# Patient Record
Sex: MALE | Race: WHITE | NOT HISPANIC OR LATINO | Employment: STUDENT | URBAN - METROPOLITAN AREA
[De-identification: names, ages, dates, MRNs, and addresses within clinical notes are randomized per-mention and may not be internally consistent; named-entity substitution may affect disease eponyms.]

---
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
## 2021-11-07 ENCOUNTER — APPOINTMENT (EMERGENCY)
Dept: RADIOLOGY | Facility: HOSPITAL | Age: 21
End: 2021-11-07
Payer: COMMERCIAL

## 2021-11-07 ENCOUNTER — HOSPITAL ENCOUNTER (EMERGENCY)
Facility: HOSPITAL | Age: 21
Discharge: HOME | End: 2021-11-07
Attending: EMERGENCY MEDICINE | Admitting: EMERGENCY MEDICINE
Payer: COMMERCIAL

## 2021-11-07 VITALS
OXYGEN SATURATION: 97 % | HEART RATE: 70 BPM | SYSTOLIC BLOOD PRESSURE: 114 MMHG | RESPIRATION RATE: 18 BRPM | WEIGHT: 190 LBS | TEMPERATURE: 99.3 F | BODY MASS INDEX: 27.2 KG/M2 | HEIGHT: 70 IN | DIASTOLIC BLOOD PRESSURE: 60 MMHG

## 2021-11-07 DIAGNOSIS — E86.0 DEHYDRATION: Primary | ICD-10-CM

## 2021-11-07 LAB
ALBUMIN SERPL BCP-MCNC: 4.3 G/DL (ref 3.5–5)
ALP SERPL-CCNC: 82 U/L (ref 46–116)
ALT SERPL W P-5'-P-CCNC: 39 U/L (ref 12–78)
AMPHETAMINES SERPL QL SCN: NEGATIVE
ANION GAP SERPL CALCULATED.3IONS-SCNC: 8 MMOL/L (ref 4–13)
AST SERPL W P-5'-P-CCNC: 23 U/L (ref 5–45)
BARBITURATES UR QL: NEGATIVE
BASOPHILS # BLD AUTO: 0.03 THOUSANDS/ΜL (ref 0–0.1)
BASOPHILS NFR BLD AUTO: 0 % (ref 0–1)
BENZODIAZ UR QL: NEGATIVE
BILIRUB SERPL-MCNC: 0.81 MG/DL (ref 0.2–1)
BILIRUB UR QL STRIP: NEGATIVE
BUN SERPL-MCNC: 12 MG/DL (ref 5–25)
CALCIUM SERPL-MCNC: 8.8 MG/DL (ref 8.3–10.1)
CHLORIDE SERPL-SCNC: 103 MMOL/L (ref 100–108)
CLARITY UR: CLEAR
CO2 SERPL-SCNC: 30 MMOL/L (ref 21–32)
COCAINE UR QL: NEGATIVE
COLOR UR: YELLOW
CREAT SERPL-MCNC: 1.11 MG/DL (ref 0.6–1.3)
D DIMER PPP FEU-MCNC: <0.27 UG/ML FEU
EOSINOPHIL # BLD AUTO: 0.02 THOUSAND/ΜL (ref 0–0.61)
EOSINOPHIL NFR BLD AUTO: 0 % (ref 0–6)
ERYTHROCYTE [DISTWIDTH] IN BLOOD BY AUTOMATED COUNT: 11.9 % (ref 11.6–15.1)
FLUAV RNA RESP QL NAA+PROBE: NEGATIVE
FLUBV RNA RESP QL NAA+PROBE: NEGATIVE
GFR SERPL CREATININE-BSD FRML MDRD: 94 ML/MIN/1.73SQ M
GLUCOSE SERPL-MCNC: 89 MG/DL (ref 65–140)
GLUCOSE UR STRIP-MCNC: NEGATIVE MG/DL
HCT VFR BLD AUTO: 43 % (ref 36.5–49.3)
HGB BLD-MCNC: 15 G/DL (ref 12–17)
HGB UR QL STRIP.AUTO: NEGATIVE
IMM GRANULOCYTES # BLD AUTO: 0.01 THOUSAND/UL (ref 0–0.2)
IMM GRANULOCYTES NFR BLD AUTO: 0 % (ref 0–2)
KETONES UR STRIP-MCNC: NEGATIVE MG/DL
LEUKOCYTE ESTERASE UR QL STRIP: NEGATIVE
LIPASE SERPL-CCNC: 66 U/L (ref 73–393)
LYMPHOCYTES # BLD AUTO: 1.79 THOUSANDS/ΜL (ref 0.6–4.47)
LYMPHOCYTES NFR BLD AUTO: 21 % (ref 14–44)
MCH RBC QN AUTO: 31.1 PG (ref 26.8–34.3)
MCHC RBC AUTO-ENTMCNC: 34.9 G/DL (ref 31.4–37.4)
MCV RBC AUTO: 89 FL (ref 82–98)
METHADONE UR QL: NEGATIVE
MONOCYTES # BLD AUTO: 0.67 THOUSAND/ΜL (ref 0.17–1.22)
MONOCYTES NFR BLD AUTO: 8 % (ref 4–12)
NEUTROPHILS # BLD AUTO: 5.97 THOUSANDS/ΜL (ref 1.85–7.62)
NEUTS SEG NFR BLD AUTO: 71 % (ref 43–75)
NITRITE UR QL STRIP: NEGATIVE
NRBC BLD AUTO-RTO: 0 /100 WBCS
OPIATES UR QL SCN: NEGATIVE
OXYCODONE+OXYMORPHONE UR QL SCN: NEGATIVE
PCP UR QL: NEGATIVE
PH UR STRIP.AUTO: 6.5 [PH]
PLATELET # BLD AUTO: 218 THOUSANDS/UL (ref 149–390)
PMV BLD AUTO: 9.8 FL (ref 8.9–12.7)
POTASSIUM SERPL-SCNC: 3.9 MMOL/L (ref 3.5–5.3)
PROT SERPL-MCNC: 7.2 G/DL (ref 6.4–8.2)
PROT UR STRIP-MCNC: NEGATIVE MG/DL
RBC # BLD AUTO: 4.83 MILLION/UL (ref 3.88–5.62)
RSV RNA RESP QL NAA+PROBE: NEGATIVE
S PYO DNA THROAT QL NAA+PROBE: NORMAL
SARS-COV-2 RNA RESP QL NAA+PROBE: NEGATIVE
SODIUM SERPL-SCNC: 141 MMOL/L (ref 136–145)
SP GR UR STRIP.AUTO: 1.01 (ref 1–1.03)
THC UR QL: NEGATIVE
TROPONIN I SERPL-MCNC: <0.02 NG/ML
UROBILINOGEN UR QL STRIP.AUTO: 0.2 E.U./DL
WBC # BLD AUTO: 8.49 THOUSAND/UL (ref 4.31–10.16)

## 2021-11-07 PROCEDURE — 85025 COMPLETE CBC W/AUTO DIFF WBC: CPT | Performed by: EMERGENCY MEDICINE

## 2021-11-07 PROCEDURE — 80307 DRUG TEST PRSMV CHEM ANLYZR: CPT | Performed by: EMERGENCY MEDICINE

## 2021-11-07 PROCEDURE — 83690 ASSAY OF LIPASE: CPT | Performed by: EMERGENCY MEDICINE

## 2021-11-07 PROCEDURE — 99285 EMERGENCY DEPT VISIT HI MDM: CPT

## 2021-11-07 PROCEDURE — 84484 ASSAY OF TROPONIN QUANT: CPT | Performed by: EMERGENCY MEDICINE

## 2021-11-07 PROCEDURE — 85379 FIBRIN DEGRADATION QUANT: CPT | Performed by: EMERGENCY MEDICINE

## 2021-11-07 PROCEDURE — 96360 HYDRATION IV INFUSION INIT: CPT

## 2021-11-07 PROCEDURE — 36415 COLL VENOUS BLD VENIPUNCTURE: CPT | Performed by: EMERGENCY MEDICINE

## 2021-11-07 PROCEDURE — 71045 X-RAY EXAM CHEST 1 VIEW: CPT

## 2021-11-07 PROCEDURE — 0241U HB NFCT DS VIR RESP RNA 4 TRGT: CPT | Performed by: EMERGENCY MEDICINE

## 2021-11-07 PROCEDURE — 81003 URINALYSIS AUTO W/O SCOPE: CPT | Performed by: EMERGENCY MEDICINE

## 2021-11-07 PROCEDURE — 80053 COMPREHEN METABOLIC PANEL: CPT | Performed by: EMERGENCY MEDICINE

## 2021-11-07 PROCEDURE — 87651 STREP A DNA AMP PROBE: CPT | Performed by: EMERGENCY MEDICINE

## 2021-11-07 PROCEDURE — 99285 EMERGENCY DEPT VISIT HI MDM: CPT | Performed by: EMERGENCY MEDICINE

## 2021-11-07 RX ADMIN — SODIUM CHLORIDE 1000 ML: 0.9 INJECTION, SOLUTION INTRAVENOUS at 19:55

## 2022-06-11 ENCOUNTER — OFFICE VISIT (OUTPATIENT)
Dept: URGENT CARE | Facility: CLINIC | Age: 22
End: 2022-06-11
Payer: COMMERCIAL

## 2022-06-11 ENCOUNTER — APPOINTMENT (OUTPATIENT)
Dept: RADIOLOGY | Facility: CLINIC | Age: 22
End: 2022-06-11
Payer: COMMERCIAL

## 2022-06-11 VITALS
HEIGHT: 71 IN | OXYGEN SATURATION: 99 % | RESPIRATION RATE: 16 BRPM | WEIGHT: 197 LBS | TEMPERATURE: 96.7 F | BODY MASS INDEX: 27.58 KG/M2 | HEART RATE: 87 BPM | DIASTOLIC BLOOD PRESSURE: 73 MMHG | SYSTOLIC BLOOD PRESSURE: 129 MMHG

## 2022-06-11 DIAGNOSIS — S40.811A ARM ABRASION, RIGHT, INITIAL ENCOUNTER: ICD-10-CM

## 2022-06-11 DIAGNOSIS — S43.401A SPRAIN OF RIGHT SHOULDER, UNSPECIFIED SHOULDER SPRAIN TYPE, INITIAL ENCOUNTER: Primary | ICD-10-CM

## 2022-06-11 DIAGNOSIS — S60.022A CONTUSION OF LEFT INDEX FINGER WITHOUT DAMAGE TO NAIL, INITIAL ENCOUNTER: ICD-10-CM

## 2022-06-11 DIAGNOSIS — S69.90XA FINGER INJURY, INITIAL ENCOUNTER: ICD-10-CM

## 2022-06-11 DIAGNOSIS — S49.91XA SHOULDER INJURY, RIGHT, INITIAL ENCOUNTER: ICD-10-CM

## 2022-06-11 PROCEDURE — 73140 X-RAY EXAM OF FINGER(S): CPT

## 2022-06-11 PROCEDURE — 99203 OFFICE O/P NEW LOW 30 MIN: CPT | Performed by: PHYSICIAN ASSISTANT

## 2022-06-11 PROCEDURE — 73030 X-RAY EXAM OF SHOULDER: CPT

## 2022-06-11 RX ORDER — CEPHALEXIN 500 MG/1
500 CAPSULE ORAL EVERY 8 HOURS SCHEDULED
Qty: 15 CAPSULE | Refills: 0 | Status: SHIPPED | OUTPATIENT
Start: 2022-06-11 | End: 2022-06-16
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
## 2022-06-11 NOTE — PATIENT INSTRUCTIONS
My interpretation of x-rays is no acute fractures or dislocations, official read is pending  Wounds have been cleaned and antibiotic ointment applied  To keep areas clean and dry for next 48-72 hours  Take antibiotic as prescribed for the next 5 days  Patient is up-to-date on his tetanus

## 2022-08-05 ENCOUNTER — HOSPITAL ENCOUNTER (EMERGENCY)
Facility: HOSPITAL | Age: 22
Discharge: HOME | End: 2022-08-05
Attending: EMERGENCY MEDICINE
Payer: COMMERCIAL

## 2022-08-05 VITALS
HEART RATE: 97 BPM | RESPIRATION RATE: 18 BRPM | DIASTOLIC BLOOD PRESSURE: 65 MMHG | SYSTOLIC BLOOD PRESSURE: 141 MMHG | TEMPERATURE: 98.1 F | OXYGEN SATURATION: 98 %

## 2022-08-05 DIAGNOSIS — S61.419A HAND LACERATION: Primary | ICD-10-CM

## 2022-08-05 PROCEDURE — 99282 EMERGENCY DEPT VISIT SF MDM: CPT

## 2022-08-05 PROCEDURE — 12001 RPR S/N/AX/GEN/TRNK 2.5CM/<: CPT | Performed by: EMERGENCY MEDICINE

## 2022-08-05 PROCEDURE — 99282 EMERGENCY DEPT VISIT SF MDM: CPT | Performed by: EMERGENCY MEDICINE

## 2022-08-05 RX ORDER — LIDOCAINE HYDROCHLORIDE 10 MG/ML
5 INJECTION, SOLUTION EPIDURAL; INFILTRATION; INTRACAUDAL; PERINEURAL ONCE
Status: COMPLETED | OUTPATIENT
Start: 2022-08-05 | End: 2022-08-05

## 2022-08-05 RX ADMIN — LIDOCAINE HYDROCHLORIDE 5 ML: 10 INJECTION, SOLUTION EPIDURAL; INFILTRATION; INTRACAUDAL; PERINEURAL at 01:16

## 2022-08-05 NOTE — ED NOTES
Pt seen, assessed and d/c by provider  Pt appeared to be in no acute distress upon discharge  Pt able to ambulate well without assistance upon exiting        Donal Berry RN  08/05/22 9410
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
## 2022-08-06 NOTE — ED PROCEDURE NOTE
PROCEDURE  Laceration repair    Date/Time: 8/5/2022 6:00 AM  Performed by: Mala Grossman MD  Authorized by: Mala Grossman MD   Consent given by: patient  Patient identity confirmed: arm band, verbally with patient and hospital-assigned identification number  Body area: upper extremity  Location details: right hand  Anesthesia: local infiltration    Anesthesia:  Local Anesthetic: lidocaine 1% without epinephrine  Anesthetic total: 5 mL      Procedure Details:  Irrigation solution: saline  Irrigation method: jet lavage  Skin closure: 4-0 nylon  Approximation: close  Approximation difficulty: simple  Dressing: 4x4 sterile gauze  Patient tolerance: patient tolerated the procedure well with no immediate complications           Mala Grossman MD  08/06/22 [id number]       Mala Grossman MD  08/06/22 6714

## 2022-11-07 ENCOUNTER — HOSPITAL ENCOUNTER (EMERGENCY)
Facility: HOSPITAL | Age: 22
Discharge: HOME | End: 2022-11-07
Attending: EMERGENCY MEDICINE

## 2022-11-07 VITALS
DIASTOLIC BLOOD PRESSURE: 64 MMHG | WEIGHT: 209.44 LBS | SYSTOLIC BLOOD PRESSURE: 124 MMHG | TEMPERATURE: 98.5 F | HEART RATE: 108 BPM | BODY MASS INDEX: 29.21 KG/M2 | RESPIRATION RATE: 22 BRPM | OXYGEN SATURATION: 100 %

## 2022-11-07 DIAGNOSIS — B34.9 VIRAL SYNDROME: Primary | ICD-10-CM

## 2022-11-07 DIAGNOSIS — J32.9 SINUSITIS: ICD-10-CM

## 2022-11-07 LAB
FLUAV RNA RESP QL NAA+PROBE: NEGATIVE
FLUBV RNA RESP QL NAA+PROBE: NEGATIVE
RSV RNA RESP QL NAA+PROBE: NEGATIVE
S PYO DNA THROAT QL NAA+PROBE: NOT DETECTED
SARS-COV-2 RNA RESP QL NAA+PROBE: NEGATIVE

## 2022-11-07 RX ORDER — AMOXICILLIN 500 MG/1
500 CAPSULE ORAL 3 TIMES DAILY
Qty: 21 CAPSULE | Refills: 0 | Status: SHIPPED | OUTPATIENT
Start: 2022-11-07 | End: 2022-11-14

## 2022-11-07 RX ORDER — ACETAMINOPHEN 325 MG/1
650 TABLET ORAL ONCE
Status: COMPLETED | OUTPATIENT
Start: 2022-11-07 | End: 2022-11-07

## 2022-11-07 RX ADMIN — ACETAMINOPHEN 650 MG: 325 TABLET, FILM COATED ORAL at 19:27

## 2022-11-08 NOTE — DISCHARGE INSTRUCTIONS
We will call you tomorrow with the covid, flu, RSV and strep results  Rest as needed  Tylenol and/or Advil as needed for pain or fever  Keep hydrated  It is fine to use over the counter cough or cold medicines as needed for symptoms

## 2022-11-08 NOTE — ED PROVIDER NOTES
History  Chief Complaint   Patient presents with   • Fever - 9 weeks to 74 years     Started with neck pain several days  Ago progressed to fever, chills  States hearing off  C/o weakness all over, dizziness, back pain when breathing in and sore throat     24 yo male c/o neck and upper back pains that started 4 days ago  Then developed sinus pressure and congestion and ears felt blocked  + fever since last night   + sore throat and mild dry cough  No vomiting or diarrhea  History provided by:  Patient   used: No    Fever - 9 weeks to 74 years  Associated symptoms: congestion, cough, ear pain, myalgias and sore throat    Associated symptoms: no diarrhea and no vomiting        None       History reviewed  No pertinent past medical history  Past Surgical History:   Procedure Laterality Date   • HERNIA REPAIR         History reviewed  No pertinent family history  I have reviewed and agree with the history as documented  E-Cigarette/Vaping   • E-Cigarette Use Current Every Day User      E-Cigarette/Vaping Substances     Social History     Tobacco Use   • Smoking status: Former Smoker     Packs/day: 0 00     Types: E-Cigarettes   • Smokeless tobacco: Never Used   Vaping Use   • Vaping Use: Every day   Substance Use Topics   • Alcohol use: Yes     Comment: socially   • Drug use: No       Review of Systems   Constitutional: Positive for fever  HENT: Positive for congestion, ear pain and sore throat  Respiratory: Positive for cough  Gastrointestinal: Negative for diarrhea and vomiting  Musculoskeletal: Positive for back pain, myalgias and neck pain  Physical Exam  Physical Exam  Vitals and nursing note reviewed  Constitutional:       General: He is not in acute distress  Appearance: He is not ill-appearing  HENT:      Head: Normocephalic and atraumatic        Right Ear: Tympanic membrane and external ear normal       Left Ear: Tympanic membrane and external ear normal  Nose: Nose normal  No congestion  Mouth/Throat:      Mouth: Mucous membranes are moist       Pharynx: Oropharynx is clear  Posterior oropharyngeal erythema present  No oropharyngeal exudate  Eyes:      General: No scleral icterus  Conjunctiva/sclera: Conjunctivae normal    Cardiovascular:      Rate and Rhythm: Normal rate and regular rhythm  Heart sounds: Normal heart sounds  Pulmonary:      Effort: Pulmonary effort is normal  No respiratory distress  Breath sounds: Normal breath sounds  Abdominal:      General: There is no distension  Palpations: Abdomen is soft  Tenderness: There is no abdominal tenderness  Musculoskeletal:         General: Normal range of motion  Cervical back: Normal range of motion and neck supple  Right lower leg: No edema  Lymphadenopathy:      Cervical: No cervical adenopathy  Skin:     General: Skin is warm and dry  Neurological:      General: No focal deficit present  Mental Status: He is alert and oriented to person, place, and time  Cranial Nerves: No cranial nerve deficit  Motor: No weakness     Psychiatric:         Mood and Affect: Mood normal          Behavior: Behavior normal          Vital Signs  ED Triage Vitals   Temperature Pulse Respirations Blood Pressure SpO2   11/07/22 1846 11/07/22 1846 11/07/22 1846 11/07/22 2040 11/07/22 1846   100 1 °F (37 8 °C) (!) 108 22 124/64 100 %      Temp Source Heart Rate Source Patient Position - Orthostatic VS BP Location FiO2 (%)   11/07/22 1846 11/07/22 1846 11/07/22 1846 11/07/22 1846 --   Tympanic Monitor Sitting Right arm       Pain Score       11/07/22 1846       7           Vitals:    11/07/22 1846 11/07/22 2040   BP:  124/64   Pulse: (!) 108    Patient Position - Orthostatic VS: Sitting Lying         Visual Acuity      ED Medications  Medications   acetaminophen (TYLENOL) tablet 650 mg (650 mg Oral Given 11/7/22 1927)       Diagnostic Studies  Results Reviewed Procedure Component Value Units Date/Time    Strep A PCR [[id number]] Collected: 11/07/22 2045    Lab Status: In process Specimen: Throat Updated: 11/07/22 2049    FLU/RSV/COVID - if FLU/RSV clinically relevant [[id number]] Collected: 11/07/22 2045    Lab Status: In process Specimen: Nares from Nose Updated: 11/07/22 2049                 No orders to display              Procedures  Procedures         ED Course                               SBIRT 22yo+    Flowsheet Row Most Recent Value   SBIRT (25 yo +)    In order to provide better care to our patients, we are screening all of our patients for alcohol and drug use  Would it be okay to ask you these screening questions? No Filed at: 11/07/2022 2039                    MDM  Number of Diagnoses or Management Options  Sinusitis  Viral syndrome  Diagnosis management comments: Advised likely viral illness  Will screen for strep, covid, flu, and RSV  Advised will send script in for Amoxil for sinusitis but to wait until viral studies come back and give it a few days and only start it if getting worse or not improving  Disposition  Final diagnoses:   Viral syndrome   Sinusitis     Time reflects when diagnosis was documented in both MDM as applicable and the Disposition within this note     Time User Action Codes Description Comment    77/9/8553  4:90 PM Sheryl Henle A Add [B14 5] Viral syndrome     28/7/1728  3:98 PM Kameron Rincon Add [A46 7] Sinusitis       ED Disposition     ED Disposition   Discharge    Condition   Stable    Date/Time   Mon Nov 7, 2022  8:44 PM    Comment   Jericho Suzie discharge to home/self care                 Follow-up Information     Follow up With Specialties Details Why   Rocio Edwards MD Family Medicine  As needed [address]  [phone]            Patient's Medications   Discharge Prescriptions    AMOXICILLIN (AMOXIL) 500 MG CAPSULE    Take 1 capsule (500 mg total) by mouth 3 (three) times a day for 7 days       Start Date: 11/7/2022 End Date: 11/14/2022       Order Dose: 500 mg       Quantity: 21 capsule    Refills: 0       No discharge procedures on file      PDMP Review     None          ED Provider  Electronically Signed by           Jeff Jaimes MD  38/87/52 6450

## 2022-11-18 ENCOUNTER — HOSPITAL ENCOUNTER (EMERGENCY)
Facility: HOSPITAL | Age: 22
Discharge: HOME | End: 2022-11-18
Attending: EMERGENCY MEDICINE

## 2022-11-18 VITALS
HEART RATE: 78 BPM | RESPIRATION RATE: 20 BRPM | OXYGEN SATURATION: 99 % | SYSTOLIC BLOOD PRESSURE: 157 MMHG | DIASTOLIC BLOOD PRESSURE: 81 MMHG | TEMPERATURE: 99.2 F

## 2022-11-18 DIAGNOSIS — J02.9 VIRAL PHARYNGITIS: Primary | ICD-10-CM

## 2022-11-18 LAB — HETEROPH AB SER QL: POSITIVE

## 2022-11-18 RX ORDER — IBUPROFEN 400 MG/1
400 TABLET ORAL ONCE
Status: COMPLETED | OUTPATIENT
Start: 2022-11-18 | End: 2022-11-18

## 2022-11-18 RX ADMIN — IBUPROFEN 400 MG: 400 TABLET, FILM COATED ORAL at 06:45

## 2022-11-18 NOTE — DISCHARGE INSTRUCTIONS
We have sent a Monospot and will follow-up  Finish the amoxicillin once started    Motrin 400 mg every 6 hours may be taken for pain  Tylenol 975 mg every 6 hours may be taken for pain    Encourage fluids  Follow-up with your primary care provider if no improvement next 2-3 days      Return to the ED for drooling, hoarseness of voice, worsening symptoms

## 2022-11-18 NOTE — ED PROVIDER NOTES
History  Chief Complaint   Patient presents with   • Sore Throat     Pt c/o of painful sore throat  Pt  says he tested negative for strep two weeks ago but thinks he may now have it  Pt  started taking antibiotics last weds  Patient is a 26-year-old white male who reports approximally 1 1/2 week  history of sore throat  Was seen here on November 7th  Had a negative strep and negative flu/COVID/RSV at that time  Was prescribed amoxicillin which he has been taking for the past 7 days  States he noticed "white spots" to  posterior pharynx this morning which prompted the ED visit  Denies fever or shaking chills  Denies drooling  Denies voice changes  Denies ear pain or rhinorrhea  Denies any other complaints  None       No past medical history on file  Past Surgical History:   Procedure Laterality Date   • HERNIA REPAIR         No family history on file  I have reviewed and agree with the history as documented  E-Cigarette/Vaping   • E-Cigarette Use Current Every Day User      E-Cigarette/Vaping Substances     Social History     Tobacco Use   • Smoking status: Former     Packs/day: 0 00     Types: E-Cigarettes, Cigarettes   • Smokeless tobacco: Never   Vaping Use   • Vaping Use: Every day   Substance Use Topics   • Alcohol use: Yes     Comment: socially   • Drug use: No       Review of Systems   Constitutional: Negative for chills and fever  HENT: Positive for sore throat  Negative for ear pain, trouble swallowing and voice change  Eyes: Negative for pain  Respiratory: Negative for cough and shortness of breath  Cardiovascular: Negative for chest pain and palpitations  Gastrointestinal: Negative for abdominal pain, nausea and vomiting  Genitourinary: Negative for dysuria and hematuria  Musculoskeletal: Negative for arthralgias and back pain  Skin: Negative for color change and rash  Neurological: Negative for syncope and facial asymmetry     All other systems reviewed and are negative  Physical Exam  Physical Exam  Vitals and nursing note reviewed  Constitutional:       General: He is not in acute distress  Appearance: He is well-developed  He is not ill-appearing, toxic-appearing or diaphoretic  HENT:      Head: Normocephalic and atraumatic  Right Ear: Tympanic membrane and ear canal normal       Left Ear: Tympanic membrane and ear canal normal       Nose: No congestion or rhinorrhea  Mouth/Throat:      Mouth: Mucous membranes are moist  No oral lesions  Pharynx: Oropharyngeal exudate present  No pharyngeal swelling or uvula swelling  Tonsils: No tonsillar abscesses  Comments: Exudate on the posterior pharynx  No abscess  Eyes:      Conjunctiva/sclera: Conjunctivae normal       Pupils: Pupils are equal, round, and reactive to light  Cardiovascular:      Rate and Rhythm: Normal rate and regular rhythm  Heart sounds: Normal heart sounds  Pulmonary:      Effort: Pulmonary effort is normal       Breath sounds: Normal breath sounds  Abdominal:      General: Bowel sounds are normal       Palpations: Abdomen is soft  Musculoskeletal:      Cervical back: Normal range of motion and neck supple  Lymphadenopathy:      Cervical: No cervical adenopathy  Skin:     General: Skin is warm and dry  Capillary Refill: Capillary refill takes less than 2 seconds  Neurological:      Mental Status: He is alert           Vital Signs  ED Triage Vitals [11/18/22 0627]   Temperature Pulse Respirations Blood Pressure SpO2   99 2 °F (37 3 °C) 78 20 157/81 99 %      Temp Source Heart Rate Source Patient Position - Orthostatic VS BP Location FiO2 (%)   Oral Monitor Lying Right arm --      Pain Score       8           Vitals:    11/18/22 0627   BP: 157/81   Pulse: 78   Patient Position - Orthostatic VS: Lying         Visual Acuity      ED Medications  Medications   ibuprofen (MOTRIN) tablet 400 mg (400 mg Oral Given 11/18/22 0645)       Diagnostic Studies  Results Reviewed     Procedure Component Value Units Date/Time    Mononucleosis screen [[id number]] Collected: 11/18/22 0650    Lab Status: No result Specimen: Blood from Arm, Right                  No orders to display              Procedures  Procedures         ED Course                               SBIRT 22yo+    Flowsheet Row Most Recent Value   SBIRT (25 yo +)    In order to provide better care to our patients, we are screening all of our patients for alcohol and drug use  Would it be okay to ask you these screening questions? No Filed at: 11/18/2022 6606                    [facility]  Number of Diagnoses or Management Options  Viral pharyngitis: new and requires workup  Diagnosis management comments: 20-year-old white male well-appearing nontoxic  Presents with 1 1/2  week history of sore throat  Currently on amoxicillin for past 7 days  Had negative rapid strep and negative flu/RSV/COVID 11 days ago in the ED  He has noted exudate on his posterior pharynx this morning prompting ED visit  There is no evidence of tonsillar or pharyngeal abscess  There is no drooling or trismus  Advised to complete antibiotic 1 started  Monospot was sent  Suspect viral etiology  Advised follow-up with primary care provider next 2-3 days if no improvement    Return precautions given including hoarseness of voice, drooling, worsening symptoms       Amount and/or Complexity of Data Reviewed  Clinical lab tests: ordered  Tests in the medicine section of CPT®: ordered  Decide to obtain previous medical records or to obtain history from someone other than the patient: yes  Review and summarize past medical records: yes  Independent visualization of images, tracings, or specimens: yes    Risk of Complications, Morbidity, and/or Mortality  Presenting problems: low  Diagnostic procedures: low  Management options: low    Patient Progress  Patient progress: stable      Disposition  Final diagnoses:   Viral pharyngitis     Time reflects when diagnosis was documented in both MDM as applicable and the Disposition within this note     Time User Action Codes Description Comment    11/18/2022  6:44 AM Usha Yeh Add [J02 9] Viral pharyngitis       ED Disposition     ED Disposition   Discharge    Condition   Stable    Date/Time   Fri Nov 18, 2022  6:44 AM    Comment   Camilo Grubbs discharge to home/self care  Follow-up Information     Follow up With Specialties Details Why   [address], [address] [address]  [phone]            Patient's Medications    No medications on file       No discharge procedures on file      PDMP Review     None          ED Provider  Electronically Signed by           Evangelina Mccrudy PA-C  11/18/22 8944

## 2023-12-14 ENCOUNTER — HOSPITAL ENCOUNTER (EMERGENCY)
Facility: HOSPITAL | Age: 23
Discharge: HOME | End: 2023-12-15
Attending: EMERGENCY MEDICINE
Payer: COMMERCIAL

## 2023-12-14 VITALS
SYSTOLIC BLOOD PRESSURE: 133 MMHG | HEIGHT: 71 IN | WEIGHT: 218.2 LBS | OXYGEN SATURATION: 100 % | HEART RATE: 91 BPM | BODY MASS INDEX: 30.55 KG/M2 | DIASTOLIC BLOOD PRESSURE: 74 MMHG | TEMPERATURE: 97.9 F | RESPIRATION RATE: 20 BRPM

## 2023-12-14 DIAGNOSIS — R06.00 DYSPNEA: Primary | ICD-10-CM

## 2023-12-14 PROCEDURE — 99284 EMERGENCY DEPT VISIT MOD MDM: CPT | Performed by: EMERGENCY MEDICINE

## 2023-12-14 PROCEDURE — 0241U HB NFCT DS VIR RESP RNA 4 TRGT: CPT | Performed by: EMERGENCY MEDICINE

## 2023-12-14 PROCEDURE — 99285 EMERGENCY DEPT VISIT HI MDM: CPT

## 2023-12-15 ENCOUNTER — APPOINTMENT (EMERGENCY)
Dept: RADIOLOGY | Facility: HOSPITAL | Age: 23
End: 2023-12-15
Payer: COMMERCIAL

## 2023-12-15 LAB
ATRIAL RATE: 62 BPM
FLUAV RNA RESP QL NAA+PROBE: NEGATIVE
FLUBV RNA RESP QL NAA+PROBE: NEGATIVE
P AXIS: 43 DEGREES
PR INTERVAL: 134 MS
QRS AXIS: 55 DEGREES
QRSD INTERVAL: 90 MS
QT INTERVAL: 368 MS
QTC INTERVAL: 373 MS
RSV RNA RESP QL NAA+PROBE: NEGATIVE
SARS-COV-2 RNA RESP QL NAA+PROBE: NEGATIVE
T WAVE AXIS: 39 DEGREES
VENTRICULAR RATE: 62 BPM

## 2023-12-15 PROCEDURE — 93005 ELECTROCARDIOGRAM TRACING: CPT

## 2023-12-15 PROCEDURE — 71045 X-RAY EXAM CHEST 1 VIEW: CPT

## 2023-12-15 NOTE — ED PROVIDER NOTES
History  Chief Complaint   Patient presents with    Shortness of Breath     Pt states a half hour ago he started having SOB. Pt states SOB increases when "pressing on liver". Pt has hx of anxiety. Pt denies chest pain. Patient presents for evaluation of shortness of breath. States he got discomfort in his upper abdomen that when he pushed on that made it hard to breathe. He also admits to history of anxiety. Was sick about a week ago not sure if he fully recovered and [facility] History provided by:  Patient   used: No    Shortness of Breath  Associated symptoms: no abdominal pain, no chest pain, no cough, no ear pain, no fever, no rash, no sore throat and no vomiting        None       History reviewed. No pertinent past medical history. Past Surgical History:   Procedure Laterality Date    HERNIA REPAIR         History reviewed. No pertinent family history. I have reviewed and agree with the history as documented. E-Cigarette/Vaping    E-Cigarette Use Current Every Day User      E-Cigarette/Vaping Substances     Social History     Tobacco Use    Smoking status: Former     Types: E-Cigarettes, Cigarettes    Smokeless tobacco: Never   Vaping Use    Vaping status: Every Day   Substance Use Topics    Alcohol use: Yes     Comment: socially    Drug use: No       Review of Systems   Constitutional:  Negative for chills and fever. HENT:  Negative for ear pain and sore throat. Eyes:  Negative for pain and visual disturbance. Respiratory:  Positive for shortness of breath. Negative for cough. Cardiovascular:  Negative for chest pain and palpitations. Gastrointestinal:  Negative for abdominal pain and vomiting. Genitourinary:  Negative for dysuria and hematuria. Musculoskeletal:  Negative for arthralgias and back pain. Skin:  Negative for color change and rash. Neurological:  Negative for seizures and syncope.    All other systems reviewed and are negative. Physical Exam  Physical Exam  Vitals and nursing note reviewed. Constitutional:       General: He is not in acute distress. Cardiovascular:      Rate and Rhythm: Normal rate and regular rhythm. Pulmonary:      Effort: Pulmonary effort is normal. No respiratory distress. Breath sounds: Normal breath sounds. Abdominal:      Tenderness: There is no abdominal tenderness. Neurological:      General: No focal deficit present. Mental Status: He is alert and oriented to person, place, and time. Vital Signs  ED Triage Vitals [12/14/23 2335]   Temperature Pulse Respirations Blood Pressure SpO2   97.9 °F (36.6 °C) 91 20 133/74 100 %      Temp Source Heart Rate Source Patient Position - Orthostatic VS BP Location FiO2 (%)   Oral Monitor Sitting Left arm --      Pain Score       No Pain           Vitals:    12/14/23 2335   BP: 133/74   Pulse: 91   Patient Position - Orthostatic VS: Sitting         Visual Acuity      ED Medications  Medications - No data to display    Diagnostic Studies  Results Reviewed       Procedure Component Value Units Date/Time    FLU/RSV/COVID - if FLU/RSV clinically relevant [[id number]]  (Normal) Collected: 12/14/23 2345    Lab Status: Final result Specimen: Nares from Nose Updated: 12/15/23 0049     SARS-CoV-2 Negative     INFLUENZA A PCR Negative     INFLUENZA B PCR Negative     RSV PCR Negative    Narrative:      FOR PEDIATRIC PATIENTS - copy/paste COVID Guidelines URL to browser: [email or web address]    SARS-CoV-2 assay is a Nucleic Acid Amplification assay intended for the  qualitative detection of nucleic acid from SARS-CoV-2 in nasopharyngeal  swabs. Results are for the presumptive identification of SARS-CoV-2 RNA. Positive results are indicative of infection with SARS-CoV-2, the virus  causing COVID-19, but do not rule out bacterial infection or co-infection  with other viruses.  Laboratories within the [facility] and its  territories are required to report all positive results to the appropriate  public health authorities. Negative results do not preclude SARS-CoV-2  infection and should not be used as the sole basis for treatment or other  patient management decisions. Negative results must be combined with  clinical observations, patient history, and epidemiological information. This test has not been FDA cleared or approved. This test has been authorized by FDA under an Emergency Use Authorization  (EUA). This test is only authorized for the duration of time the  declaration that circumstances exist justifying the authorization of the  emergency use of an in vitro diagnostic tests for detection of SARS-CoV-2  virus and/or diagnosis of COVID-19 infection under section 564(b)(1) of  the Act, 21 U. S.C. 744ITU-9(Q)(9), unless the authorization is terminated  or revoked sooner. The test has been validated but independent review by FDA  and CLIA is pending. Test performed using [employer] GeneXpert: This RT-PCR assay targets N2,  a region unique to SARS-CoV-2. A conserved region in the E-gene was chosen  for pan-Sarbecovirus detection which includes SARS-CoV-2. According to CMS-2020-01-R, this platform meets the definition of high-throughput technology.                    XR chest 1 view portable    (Results Pending)              Procedures  ECG 12 Lead Documentation Only    Date/Time: 12/15/2023 12:19 AM    Performed by: Patsy Alejandro DO  Authorized by: Patsy Alejandro DO    ECG reviewed by me, the ED Provider: yes    Patient location:  ED  Interpretation:     Interpretation: normal    Rate:     ECG rate:  62    ECG rate assessment: normal    Rhythm:     Rhythm: sinus rhythm    Ectopy:     Ectopy: none    ST segments:     ST segments:  Normal  T waves:     T waves: normal             ED Course                               SBIRT 22yo+      Flowsheet Row Most Recent Value   Initial Alcohol Screen: US AUDIT-C     1. How often do you have a drink containing alcohol? 2 Filed at: 12/14/2023 2340   2. How many drinks containing alcohol do you have on a typical day you are drinking? 0 Filed at: 12/14/2023 2340   3a. Male UNDER 65: How often do you have five or more drinks on one occasion? 0 Filed at: 12/14/2023 2340   3b. FEMALE Any Age, or MALE 65+: How often do you have 4 or more drinks on one occassion? 0 Filed at: 12/14/2023 2340   Audit-C Score 2 Filed at: 12/14/2023 2340   WANDA: How many times in the past year have you. .. Used an illegal drug or used a prescription medication for non-medical reasons? Never Filed at: 12/14/2023 2340                      Medical Decision Making  Pulse ox 100% on room air indicating adequate oxygenation. CXR: NAD as read by me    Amount and/or Complexity of Data Reviewed  Radiology: ordered and independent interpretation performed. ECG/medicine tests: ordered and independent interpretation performed. Disposition  Final diagnoses:   Dyspnea     Time reflects when diagnosis was documented in both MDM as applicable and the Disposition within this note       Time User Action Codes Description Comment    12/15/2023 12:26 AM Francisco Carcamo Add [R06.00] Dyspnea           ED Disposition       ED Disposition   Discharge    Condition   Stable    Date/Time   Fri Dec 15, 2023 [address] discharge to home/self care. Follow-up Information       Follow up With Specialties Details Why [address], MD Family Medicine In 1 week  [place] [address]  [phone]              There are no discharge medications for this patient. No discharge procedures on file.     PDMP Review       None            ED Provider  Electronically Signed by             Francisco Carcamo DO  12/15/23 5006

## 2024-07-27 ENCOUNTER — HOSPITAL ENCOUNTER (EMERGENCY)
Facility: HOSPITAL | Age: 24
Discharge: HOME | End: 2024-07-27
Attending: EMERGENCY MEDICINE
Payer: COMMERCIAL

## 2024-07-27 VITALS
SYSTOLIC BLOOD PRESSURE: 123 MMHG | WEIGHT: 203.6 LBS | BODY MASS INDEX: 28.4 KG/M2 | HEART RATE: 90 BPM | DIASTOLIC BLOOD PRESSURE: 82 MMHG | RESPIRATION RATE: 18 BRPM | TEMPERATURE: 98.3 F | OXYGEN SATURATION: 99 %

## 2024-07-27 DIAGNOSIS — K08.89 DENTALGIA: Primary | ICD-10-CM

## 2024-07-27 DIAGNOSIS — R42 VERTIGO: ICD-10-CM

## 2024-07-27 LAB
FLUAV RNA RESP QL NAA+PROBE: NEGATIVE
FLUBV RNA RESP QL NAA+PROBE: NEGATIVE
RSV RNA RESP QL NAA+PROBE: NEGATIVE
SARS-COV-2 RNA RESP QL NAA+PROBE: NEGATIVE

## 2024-07-27 PROCEDURE — 99284 EMERGENCY DEPT VISIT MOD MDM: CPT | Performed by: EMERGENCY MEDICINE

## 2024-07-27 PROCEDURE — 99283 EMERGENCY DEPT VISIT LOW MDM: CPT

## 2024-07-27 PROCEDURE — 0241U HB NFCT DS VIR RESP RNA 4 TRGT: CPT | Performed by: EMERGENCY MEDICINE

## 2024-07-27 RX ORDER — AMOXICILLIN AND CLAVULANATE POTASSIUM 875; 125 MG/1; MG/1
1 TABLET, FILM COATED ORAL EVERY 12 HOURS SCHEDULED
Qty: 14 TABLET | Refills: 0 | Status: SHIPPED | OUTPATIENT
Start: 2024-07-27 | End: 2024-08-03

## 2024-07-27 RX ORDER — AMOXICILLIN AND CLAVULANATE POTASSIUM 875; 125 MG/1; MG/1
1 TABLET, FILM COATED ORAL ONCE
Status: COMPLETED | OUTPATIENT
Start: 2024-07-27 | End: 2024-07-27

## 2024-07-27 RX ORDER — MECLIZINE HYDROCHLORIDE 25 MG/1
50 TABLET ORAL ONCE
Status: COMPLETED | OUTPATIENT
Start: 2024-07-27 | End: 2024-07-27

## 2024-07-27 RX ORDER — MECLIZINE HYDROCHLORIDE 25 MG/1
25 TABLET ORAL 3 TIMES DAILY PRN
Qty: 15 TABLET | Refills: 0 | Status: SHIPPED | OUTPATIENT
Start: 2024-07-27

## 2024-07-27 RX ADMIN — AMOXICILLIN AND CLAVULANATE POTASSIUM 1 TABLET: 875; 125 TABLET, FILM COATED ORAL at 18:41

## 2024-07-27 RX ADMIN — MECLIZINE HYDROCHLORIDE 50 MG: 25 TABLET ORAL at 18:41

## 2024-07-27 NOTE — DISCHARGE INSTRUCTIONS
Take the medicines as prescribed.  Rest as needed, move slowly and carefully.  Keep hydrated with fluids.  We will call you if your covid test is positive.

## 2024-07-27 NOTE — Clinical Note
Anthony Ramirez was seen and treated in our emergency department on 7/27/2024.                Diagnosis:     Anthony  may return to work on return date.    He may return on this date: 07/29/2024         If you have any questions or concerns, please don't hesitate to call.      Rosi Neal MD    ______________________________           _______________          _______________  Hospital Representative                              Date                                Time

## 2024-07-28 NOTE — ED PROVIDER NOTES
"History  Chief Complaint   Patient presents with    Dizziness     Here with mother. Started 1.5 months ago with right upper wisdom tooth discomfort , seen by dentist and referred to oral surgeon . States started with vertigo on 7/23 with with weakness legs. Seen by PCP yesterday and patient had been having sleep deprivation and symptoms attributed to that. Mother is concerned that patient 's tooth may have a \" deadly infection \". No headache currently. Pain and weakness legs, back and arms. Recent travel to [place].      23 yo male c/o spinning dizziness off and on for the past 4 days.  Seen by PCP who thought it was stress and sleep deprivation.  He gets nausea at times with the dizziness and it is worse when he moves around.  He had to have his mom pick him up at work tonight because of it.  He also had had pain in right upper wisdom tooth.  Seen by dentist and had xrays which were ok he says.  Pt. Was referred to oral surgery for evaluation.  He has also recently been travelling and had some leg weakness when they were in [place].  No fever, cough, vomiting, diarrhea, headache, facial swelling, ear pain.  Mom worried about an infection.      History provided by:  Patient and parent   used: No    Dizziness  Associated symptoms: nausea and weakness    Associated symptoms: no chest pain, no diarrhea, no headaches, no shortness of breath and no vomiting        None       History reviewed. No pertinent past medical history.    Past Surgical History:   Procedure Laterality Date    HERNIA REPAIR         History reviewed. No pertinent family history.  I have reviewed and agree with the history as documented.    E-Cigarette/Vaping    E-Cigarette Use Current Every Day User      E-Cigarette/Vaping Substances    Nicotine Yes     THC No     CBD No     Flavoring Yes     Other No     Unknown No      Social History     Tobacco Use    Smoking status: Some Days     Types: Cigarettes    Smokeless " tobacco: Never   Vaping Use    Vaping status: Every Day    Substances: Nicotine, Flavoring   Substance Use Topics    Alcohol use: Yes     Comment: socially    Drug use: No       Review of Systems   Constitutional: Negative.  Negative for chills and fever.   HENT:  Positive for dental problem. Negative for congestion and sore throat.    Eyes: Negative.    Respiratory: Negative.  Negative for cough and shortness of breath.    Cardiovascular: Negative.  Negative for chest pain and leg swelling.   Gastrointestinal:  Positive for nausea. Negative for abdominal pain, diarrhea and vomiting.   Genitourinary: Negative.  Negative for dysuria, flank pain and hematuria.   Musculoskeletal: Negative.  Negative for back pain and myalgias.   Skin: Negative.  Negative for rash and wound.   Neurological:  Positive for dizziness and weakness. Negative for headaches.   Psychiatric/Behavioral: Negative.  Negative for confusion and hallucinations. The patient is not nervous/anxious.    All other systems reviewed and are negative.      Physical Exam  Physical Exam  Vitals and nursing note reviewed.   Constitutional:       General: He is not in acute distress.     Appearance: He is well-developed. He is not ill-appearing or diaphoretic.   HENT:      Head: Normocephalic and atraumatic.      Comments: No facial swelling     Right Ear: Tympanic membrane and ear canal normal.      Left Ear: Tympanic membrane and ear canal normal.      Nose: Nose normal.      Mouth/Throat:      Mouth: Mucous membranes are moist.      Pharynx: Oropharynx is clear.      Comments: Teeth/gums look fine  Eyes:      General: No scleral icterus.     Conjunctiva/sclera: Conjunctivae normal.   Cardiovascular:      Rate and Rhythm: Normal rate and regular rhythm.      Heart sounds: Normal heart sounds. No murmur heard.  Pulmonary:      Effort: Pulmonary effort is normal. No respiratory distress.      Breath sounds: Normal breath sounds.   Abdominal:      General: Bowel  sounds are normal. There is no distension.      Palpations: Abdomen is soft.      Tenderness: There is no abdominal tenderness.   Musculoskeletal:         General: No deformity. Normal range of motion.      Cervical back: Normal range of motion and neck supple.      Right lower leg: No edema.      Left lower leg: No edema.   Lymphadenopathy:      Cervical: No cervical adenopathy.   Skin:     General: Skin is warm and dry.      Coloration: Skin is not pale.      Findings: No erythema or rash.   Neurological:      General: No focal deficit present.      Mental Status: He is alert and oriented to person, place, and time.      Cranial Nerves: No cranial nerve deficit.      Motor: No weakness.      Comments: Speech/motor intact, finger to nose intact, no drift, no droop   Psychiatric:         Mood and Affect: Mood normal.         Behavior: Behavior normal.         Vital Signs  ED Triage Vitals [07/27/24 1814]   Temperature Pulse Respirations Blood Pressure SpO2   98.3 °F (36.8 °C) 90 18 123/82 99 %      Temp Source Heart Rate Source Patient Position - Orthostatic VS BP Location FiO2 (%)   Tympanic Monitor Sitting Left arm --      Pain Score       --           Vitals:    07/27/24 1814   BP: 123/82   Pulse: 90   Patient Position - Orthostatic VS: Sitting         Visual Acuity      ED Medications  Medications   amoxicillin-clavulanate (AUGMENTIN) 875-125 mg per tablet 1 tablet (1 tablet Oral Given 7/27/24 1841)   meclizine (ANTIVERT) tablet 50 mg (50 mg Oral Given 7/27/24 1841)       Diagnostic Studies  Results Reviewed       Procedure Component Value Units Date/Time    FLU/RSV/COVID - if FLU/RSV clinically relevant [[id number]]  (Normal) Collected: 07/27/24 1843    Lab Status: Final result Specimen: Nares from Nose Updated: 07/27/24 1954     SARS-CoV-2 Negative     INFLUENZA A PCR Negative     INFLUENZA B PCR Negative     RSV PCR Negative    Narrative:      FOR PEDIATRIC PATIENTS - copy/paste COVID Guidelines URL to  browser: [email or web address]    SARS-CoV-2 assay is a Nucleic Acid Amplification assay intended for the  qualitative detection of nucleic acid from SARS-CoV-2 in nasopharyngeal  swabs. Results are for the presumptive identification of SARS-CoV-2 RNA.    Positive results are indicative of infection with SARS-CoV-2, the virus  causing COVID-19, but do not rule out bacterial infection or co-infection  with other viruses. Laboratories within the [place] and its  territories are required to report all positive results to the appropriate  public health authorities. Negative results do not preclude SARS-CoV-2  infection and should not be used as the sole basis for treatment or other  patient management decisions. Negative results must be combined with  clinical observations, patient history, and epidemiological information.  This test has not been FDA cleared or approved.    This test has been authorized by FDA under an Emergency Use Authorization  (EUA). This test is only authorized for the duration of time the  declaration that circumstances exist justifying the authorization of the  emergency use of an in vitro diagnostic tests for detection of SARS-CoV-2  virus and/or diagnosis of COVID-19 infection under section 564(b)(1) of  the Act, 21 U.S.C. 360bbb-3(b)(1), unless the authorization is terminated  or revoked sooner. The test has been validated but independent review by FDA  and CLIA is pending.    Test performed using [employer] GeneXpert: This RT-PCR assay targets N2,  a region unique to SARS-CoV-2. A conserved region in the E-gene was chosen  for pan-Sarbecovirus detection which includes SARS-CoV-2.    According to CMS-2020-01-R, this platform meets the definition of high-throughput technology.                   No orders to display              Procedures  Procedures         ED Course                                 SBIRT 22yo+      Flowsheet Row Most  Recent Value   Initial Alcohol Screen: US AUDIT-C     1. How often do you have a drink containing alcohol? 4 Filed at: 07/27/2024 1818   2. How many drinks containing alcohol do you have on a typical day you are drinking?  2 Filed at: 07/27/2024 1818   3a. Male UNDER 65: How often do you have five or more drinks on one occasion? 0 Filed at: 07/27/2024 1818   Audit-C Score 6 Filed at: 07/27/2024 1818   WANDA: How many times in the past year have you...    Used an illegal drug or used a prescription medication for non-medical reasons? Never Filed at: 07/27/2024 1818                      Medical Decision Making  Will try course of abx and antivert.  He would like to be screened for covid.  Advised rest, fluids, follow up if worsening.  Given work note per his request.    Risk  Prescription drug management.                 Disposition  Final diagnoses:   Dentalgia   Vertigo     Time reflects when diagnosis was documented in both MDM as applicable and the Disposition within this note       Time User Action Codes Description Comment    7/27/2024  6:32 PM Rosi Neal Add [K08.89] Dentalgia     7/27/2024  6:32 PM Rosi Neal Add [R42] Vertigo           ED Disposition       ED Disposition   Discharge    Condition   Stable    Date/Time   Sat Jul 27, 2024 1832    Comment   Anthony Ramirez discharge to home/self care.                   Follow-up Information       Follow up With Specialties Details Why Contact Info    the oral surgeon  Schedule an appointment as soon as possible for a visit               Discharge Medication List as of 7/27/2024  6:34 PM        START taking these medications    Details   amoxicillin-clavulanate (AUGMENTIN) 875-125 mg per tablet Take 1 tablet by mouth every 12 (twelve) hours for 7 days, Starting Sat 7/27/2024, Until Sat 8/3/2024, Normal      meclizine (ANTIVERT) 25 mg tablet Take 1 tablet (25 mg total) by mouth 3 (three) times a day as needed for dizziness, Starting Sat 7/27/2024,  Normal             No discharge procedures on file.    PDMP Review       None            ED Provider  Electronically Signed by             Rosi Neal MD  07/27/24 1749

## 2024-07-31 ENCOUNTER — HOSPITAL ENCOUNTER (EMERGENCY)
Facility: HOSPITAL | Age: 24
Discharge: HOME | End: 2024-07-31
Attending: EMERGENCY MEDICINE
Payer: COMMERCIAL

## 2024-07-31 VITALS
TEMPERATURE: 98.4 F | DIASTOLIC BLOOD PRESSURE: 72 MMHG | SYSTOLIC BLOOD PRESSURE: 127 MMHG | HEART RATE: 73 BPM | RESPIRATION RATE: 16 BRPM | OXYGEN SATURATION: 97 %

## 2024-07-31 DIAGNOSIS — K08.409 STATUS POST TOOTH EXTRACTION: ICD-10-CM

## 2024-07-31 DIAGNOSIS — R58 BLEEDING: Primary | ICD-10-CM

## 2024-07-31 PROCEDURE — 99283 EMERGENCY DEPT VISIT LOW MDM: CPT

## 2024-07-31 PROCEDURE — 99284 EMERGENCY DEPT VISIT MOD MDM: CPT | Performed by: EMERGENCY MEDICINE

## 2024-07-31 RX ORDER — TRANEXAMIC ACID 100 MG/ML
1000 INJECTION, SOLUTION INTRAVENOUS ONCE
Status: COMPLETED | OUTPATIENT
Start: 2024-07-31 | End: 2024-07-31

## 2024-07-31 RX ORDER — IBUPROFEN 400 MG/1
800 TABLET ORAL ONCE
Status: COMPLETED | OUTPATIENT
Start: 2024-07-31 | End: 2024-07-31

## 2024-07-31 RX ADMIN — TRANEXAMIC ACID 1000 MG: 1 INJECTION, SOLUTION INTRAVENOUS at 19:54

## 2024-07-31 RX ADMIN — IBUPROFEN 800 MG: 400 TABLET, FILM COATED ORAL at 20:23

## 2024-07-31 NOTE — ED PROVIDER NOTES
History  Chief Complaint   Patient presents with    Post-op Problem     Pt had wisdom teeth pulled today, having continued bleeding, unable to contact dentist. Called urgent care and was told to come to ED     Pt is a 25yo M who presents for dental bleeding.  Patient reports that approximately 4 hours prior to arrival he had his right upper and lower wisdom teeth extracted.  Patient reports no known complications with the extraction.  Patient was given instructions to hold gauze on the area and replace every 40 minutes.  Patient reports that despite this he has Continued bleeding.  Patient called urgent care who recommended he come to the ED for further evaluation.  Patient states no history of easy bleeding.  Patient has no known bleeding disorders in himself or his family.  Patient states he is otherwise been feeling well.        Prior to Admission Medications   Prescriptions Last Dose Informant Patient Reported? Taking?   amoxicillin-clavulanate (AUGMENTIN) 875-125 mg per tablet   No No   Sig: Take 1 tablet by mouth every 12 (twelve) hours for 7 days   meclizine (ANTIVERT) 25 mg tablet   No No   Sig: Take 1 tablet (25 mg total) by mouth 3 (three) times a day as needed for dizziness      Facility-Administered Medications: None       History reviewed. No pertinent past medical history.    Past Surgical History:   Procedure Laterality Date    HERNIA REPAIR         History reviewed. No pertinent family history.  I have reviewed and agree with the history as documented.    E-Cigarette/Vaping    E-Cigarette Use Current Every Day User      E-Cigarette/Vaping Substances    Nicotine Yes     THC No     CBD No     Flavoring Yes     Other No     Unknown No      Social History     Tobacco Use    Smoking status: Some Days     Types: Cigarettes    Smokeless tobacco: Never   Vaping Use    Vaping status: Every Day    Substances: Nicotine, Flavoring   Substance Use Topics    Alcohol use: Yes     Comment: socially    Drug use: No        Review of Systems   HENT:  Positive for dental problem.    All other systems reviewed and are negative.      Physical Exam  Physical Exam  Vitals reviewed.   Constitutional:       General: He is not in acute distress.     Appearance: He is well-developed. He is not toxic-appearing or diaphoretic.   HENT:      Head: Normocephalic and atraumatic.      Right Ear: External ear normal.      Left Ear: External ear normal.      Nose: Nose normal.      Mouth/Throat:      Comments: Fresh sockets of R upper and lower wisdom teeth with slow oozing present  Eyes:      Extraocular Movements: Extraocular movements intact.      Pupils: Pupils are equal, round, and reactive to light.   Cardiovascular:      Rate and Rhythm: Normal rate and regular rhythm.   Pulmonary:      Effort: Pulmonary effort is normal. No respiratory distress.   Abdominal:      General: There is no distension.   Musculoskeletal:         General: Normal range of motion.      Cervical back: Neck supple.   Skin:     General: Skin is warm and dry.      Capillary Refill: Capillary refill takes less than 2 seconds.      Coloration: Skin is not pale.   Neurological:      General: No focal deficit present.      Mental Status: He is alert and oriented to person, place, and time.   Psychiatric:         Speech: Speech normal.         Behavior: Behavior is cooperative.         Vital Signs  ED Triage Vitals [07/31/24 1907]   Temperature Pulse Respirations Blood Pressure SpO2   98.4 °F (36.9 °C) 73 16 127/72 97 %      Temp src Heart Rate Source Patient Position - Orthostatic VS BP Location FiO2 (%)   -- -- Sitting Right arm --      Pain Score       7           Vitals:    07/31/24 1907   BP: 127/72   Pulse: 73   Patient Position - Orthostatic VS: Sitting         Visual Acuity      ED Medications  Medications   tranexamic acid 100mg/mL (for epistaxis) 1,000 mg (1,000 mg Nasal Given 7/31/24 1954)   ibuprofen (MOTRIN) tablet 800 mg (800 mg Oral Given 7/31/24 2023)        Diagnostic Studies  Results Reviewed       None                   No orders to display              Procedures  Procedures         ED Course  ED Course as of 07/31/24 2104 Wed Jul 31, 2024 1926 Gauze rolls placed and pt instructed to bite down to apply direct pressure. Will recheck in 20 minutes.    1947 Pt with continued bleeding from upper sockets. Will do TXA soaked gauze.    1955 TXA soaked gauze placed.    2022 Bleeding largely controlled at this time. Will DC with dental rolls and outpt follow-up.                                                Medical Decision Making  Pt is a 25yo M who presents with dental bleeding.     Will first attempt direct pressure.  If unable to stop bleeding, will progress to TXA soaked gauze.  See ED course for results and details.    Plan to discharge pt with f/u to PCP and dentist. Discussed returning the ED with new or worsening of symptoms. Discussed use of over the counter medications as stated on the bottle as needed for pain. Discussed direct pressure to keep bleeding controlled if needed. Pt expressed understanding of discharge instructions, return precautions, and medication instructions and is stable for discharge at this time. All questions were answered and pt was discharged without incident.       Risk  Prescription drug management.                 Disposition  Final diagnoses:   Status post tooth extraction   Bleeding     Time reflects when diagnosis was documented in both MDM as applicable and the Disposition within this note       Time User Action Codes Description Comment    7/31/2024  8:23 PM Linda Olmos Add [K08.409] Status post tooth extraction     7/31/2024  8:23 PM Linda Olmos Add [R58] Bleeding     7/31/2024  8:23 PM Linda Olmos Modify [K08.409] Status post tooth extraction     7/31/2024  8:23 PM Linda Olmos Modify [R58] Bleeding           ED Disposition       ED Disposition   Discharge    Condition   Stable    Date/Time   Wed  Jul 31, 2024  8:23 PM    Comment   Anthony Ramirez discharge to home/self care.                   Follow-up Information       Follow up With Specialties Details Why Contact Info    Laquita Galvan MD Family Medicine Call  As needed [address] [facility] [address] [phone]              Discharge Medication List as of 7/31/2024  8:27 PM        CONTINUE these medications which have NOT CHANGED    Details   amoxicillin-clavulanate (AUGMENTIN) 875-125 mg per tablet Take 1 tablet by mouth every 12 (twelve) hours for 7 days, Starting Sat 7/27/2024, Until Sat 8/3/2024, Normal      meclizine (ANTIVERT) 25 mg tablet Take 1 tablet (25 mg total) by mouth 3 (three) times a day as needed for dizziness, Starting Sat 7/27/2024, Normal             No discharge procedures on file.    PDMP Review       None            ED Provider  Electronically Signed by             Linda Olmos MD  07/31/24 8437

## 2024-08-01 NOTE — DISCHARGE INSTRUCTIONS
Follow-up with dental for further care. Contact info provided below if needed.  Use over the counter medications as stated on the bottle as needed for pain control.  Return to the ED with new or worsening symptoms.

## 2024-11-03 ENCOUNTER — OFFICE VISIT (OUTPATIENT)
Dept: URGENT CARE | Facility: CLINIC | Age: 24
End: 2024-11-03
Payer: COMMERCIAL

## 2024-11-03 VITALS
BODY MASS INDEX: 28 KG/M2 | SYSTOLIC BLOOD PRESSURE: 126 MMHG | DIASTOLIC BLOOD PRESSURE: 59 MMHG | OXYGEN SATURATION: 97 % | HEART RATE: 77 BPM | HEIGHT: 71 IN | RESPIRATION RATE: 18 BRPM | WEIGHT: 200 LBS | TEMPERATURE: 97.7 F

## 2024-11-03 DIAGNOSIS — H61.001 PERICHONDRITIS OF EAR, RIGHT: Primary | ICD-10-CM

## 2024-11-03 PROCEDURE — 99213 OFFICE O/P EST LOW 20 MIN: CPT | Performed by: FAMILY MEDICINE

## 2024-11-03 RX ORDER — CIPROFLOXACIN 750 MG/1
750 TABLET, FILM COATED ORAL EVERY 12 HOURS SCHEDULED
Qty: 14 TABLET | Refills: 0 | Status: SHIPPED | OUTPATIENT
Start: 2024-11-03 | End: 2024-11-10

## 2024-11-03 NOTE — PROGRESS NOTES
[facility]        NAME: Anthony Ramirez is a 24 y.o. male  : 2000    MRN: [id number]  DATE: November 3, 2024  TIME: 3:08 PM    Assessment and Plan   Perichondritis of ear, right [H61.001]  1. Perichondritis of ear, right  ciprofloxacin (CIPRO) 750 mg tablet        Likely experiencing a perichondritis of the right ear per clinical evaluation.  Will treat with 1 week of ciprofloxacin.    Patient Instructions     Follow up with PCP in 3-5 days.  Proceed to  ER if symptoms worsen.    If tests have been performed at [facility], our office will contact you with results if changes need to be made to the care plan discussed with you at the visit.  You can review your full results on [facility].    Chief Complaint     Chief Complaint   Patient presents with    Wound Infection     Got piercing about 3 weeks ago, about 2 weeks ago first started with irritation an dit has been worsening.          History of Present Illness       24-year-old male presents today due to concern for infection of the right external ear.  Had a piercing that was placed about 1 month ago which was fine after about the first week.  Has noticed gradual increase in pain, tenderness and purulence.      Review of Systems   Review of Systems   Constitutional:  Negative for chills and fever.   HENT:  Positive for ear pain.    Respiratory:  Negative for cough and shortness of breath.    Cardiovascular:  Negative for chest pain.   Gastrointestinal:  Negative for abdominal pain and nausea.   Neurological:  Negative for dizziness and headaches.     Current Medications       Current Outpatient Medications:     ciprofloxacin (CIPRO) 750 mg tablet, Take 1 tablet (750 mg total) by mouth every 12 (twelve) hours for 7 days, Disp: 14 tablet, Rfl: 0    meclizine (ANTIVERT) 25 mg tablet, Take 1 tablet (25 mg total) by mouth 3 (three) times a day as needed for dizziness, Disp: 15 tablet, Rfl: 0    Current Allergies     Allergies as of 2024 -  "Reviewed 11/03/2024   Allergen Reaction Noted    Other  02/10/2018    Spectazole [econazole] Rash 11/07/2022            The following portions of the patient's history were reviewed and updated as appropriate: allergies, current medications, past family history, past medical history, past social history, past surgical history and problem list.     History reviewed. No pertinent past medical history.    Past Surgical History:   Procedure Laterality Date    HERNIA REPAIR         History reviewed. No pertinent family history.      Medications have been verified.        Objective   /59   Pulse 77   Temp 97.7 °F (36.5 °C)   Resp 18   Ht 5' 11\" (1.803 m)   Wt 90.7 kg (200 lb)   SpO2 97%   BMI 27.89 kg/m²   No LMP for male patient.       Physical Exam     Physical Exam  Vitals and nursing note reviewed.   Constitutional:       General: He is in acute distress.      Appearance: Normal appearance. He is normal weight. He is not ill-appearing, toxic-appearing or diaphoretic.   HENT:      Head: Normocephalic and atraumatic.      Right Ear: Tympanic membrane and ear canal normal. There is no impacted cerumen.      Left Ear: Tympanic membrane, ear canal and external ear normal. There is no impacted cerumen.      Ears:      Comments: Right external ear tenderness and redness.  Ear ring intact.  Eyes:      General:         Right eye: No discharge.         Left eye: No discharge.      Conjunctiva/sclera: Conjunctivae normal.   Pulmonary:      Effort: Pulmonary effort is normal.   Skin:     General: Skin is warm.      Findings: Erythema present.   Neurological:      General: No focal deficit present.      Mental Status: He is alert and oriented to person, place, and time.   Psychiatric:         Mood and Affect: Mood normal.         Behavior: Behavior normal.         Thought Content: Thought content normal.         Judgment: Judgment normal.                   "

## 2025-01-31 ENCOUNTER — HOSPITAL ENCOUNTER (EMERGENCY)
Facility: HOSPITAL | Age: 25
Discharge: HOME | End: 2025-01-31
Payer: COMMERCIAL

## 2025-01-31 VITALS
DIASTOLIC BLOOD PRESSURE: 80 MMHG | RESPIRATION RATE: 20 BRPM | SYSTOLIC BLOOD PRESSURE: 124 MMHG | WEIGHT: 187.8 LBS | HEART RATE: 103 BPM | TEMPERATURE: 98.9 F | BODY MASS INDEX: 26.19 KG/M2 | OXYGEN SATURATION: 98 %

## 2025-01-31 DIAGNOSIS — S51.811A LACERATION OF RIGHT FOREARM, INITIAL ENCOUNTER: Primary | ICD-10-CM

## 2025-01-31 PROCEDURE — 12001 RPR S/N/AX/GEN/TRNK 2.5CM/<: CPT

## 2025-01-31 PROCEDURE — 99282 EMERGENCY DEPT VISIT SF MDM: CPT

## 2025-01-31 PROCEDURE — 99284 EMERGENCY DEPT VISIT MOD MDM: CPT

## 2025-01-31 PROCEDURE — 90471 IMMUNIZATION ADMIN: CPT

## 2025-01-31 PROCEDURE — 90715 TDAP VACCINE 7 YRS/> IM: CPT

## 2025-01-31 RX ADMIN — Medication 1 APPLICATION: at 19:07

## 2025-01-31 RX ADMIN — TETANUS TOXOID, REDUCED DIPHTHERIA TOXOID AND ACELLULAR PERTUSSIS VACCINE, ADSORBED 0.5 ML: 5; 2.5; 8; 8; 2.5 SUSPENSION INTRAMUSCULAR at 19:07

## 2025-01-31 NOTE — Clinical Note
Anthony Ramirez was seen and treated in our emergency department on 1/31/2025.            no heavy lifting    Diagnosis: laceration    Anthony  may return to work on return date.    He may return on this date: 02/03/2025    Please allow light duty until sutures are removed.      If you have any questions or concerns, please don't hesitate to call.      Cris Palmer RN    ______________________________           _______________          _______________  Hospital Representative                              Date                                Time

## 2025-02-01 NOTE — DISCHARGE INSTRUCTIONS
You have 2 sutures in site, you need to have them removed in 10 days. If you notice spreading redness, swelling, difficulty with moving the arm, fevers, or other concerns then please return to the ED for evaluation. You can wash with soap and water. Please do not submerge in standing water and keep covered while doing anything that could get it dirty.

## 2025-02-02 NOTE — ED PROVIDER NOTES
Time reflects when diagnosis was documented in both MDM as applicable and the Disposition within this note       Time User Action Codes Description Comment    1/31/2025  7:17 PM Beka Estrada Add [S59.520P] Laceration of right forearm, initial encounter           ED Disposition       ED Disposition   Discharge    Condition   Stable    Date/Time   Fri Jan 31, 2025  7:17 PM    Comment   Anthony Ramirez discharge to home/self care.                   Assessment & Plan       Medical Decision Making  25-year-old male present emergency department due to a small laceration to his right forearm.  It does gape slightly with tension so discussed options including suture versus glue versus Steri-Strips.  Given that when patient bends his wrist, it puts tension on the site, opted for sutures.  2 were placed as described.  Patient was discharged with further home care recommendations and return for suture removal in 7 to 10 days.    Risk  Prescription drug management.             Medications   tetanus-diphtheria-acellular pertussis (BOOSTRIX) IM injection 0.5 mL (0.5 mL Intramuscular Given 1/31/25 1907)   LET gel 1 Application (1 Application Topical Given 1/31/25 1907)       ED Risk Strat Scores                          SBIRT 22yo+      Flowsheet Row Most Recent Value   Initial Alcohol Screen: US AUDIT-C     1. How often do you have a drink containing alcohol? 4 Filed at: 01/31/2025 1851   2. How many drinks containing alcohol do you have on a typical day you are drinking?  4 Filed at: 01/31/2025 1851   3a. Male UNDER 65: How often do you have five or more drinks on one occasion? 4 Filed at: 01/31/2025 1851   Audit-C Score 12 Filed at: 01/31/2025 1851   Full Alcohol Screen: US AUDIT    4. How often during the last year have you found that you were not able to stop drinking once you had started? 0 Filed at: 01/31/2025 1851   5. How often during past year have you failed to do what was normally expected of you because of  drinking?  0 Filed at: 01/31/2025 1851   6. How often in past year have you needed a first drink in the morning to get yourself going after a heavy drinking session?  0 Filed at: 01/31/2025 1851   7. How often in past year have you had feeling of guilt or remorse after drinking?  0 Filed at: 01/31/2025 1851   8. How often in past year have you been unable to remember what happened night before because you had been drinking?  0 Filed at: 01/31/2025 1851   9. Have you or someone else been injured as a result of your drinking?  0 Filed at: 01/31/2025 1851   10. Has a relative, friend, doctor or other health worker been concerned about your drinking and suggested you cut down?  0 Filed at: 01/31/2025 1851   AUDIT Total Score 12 Filed at: 01/31/2025 1851   WANDA: How many times in the past year have you...    Used an illegal drug or used a prescription medication for non-medical reasons? Never Filed at: 01/31/2025 1851                            History of Present Illness       Chief Complaint   Patient presents with    Extremity Laceration     States a packing tape gun started to fall off wall and he went to catch it and it cut his right forearm.        History reviewed. No pertinent past medical history.   Past Surgical History:   Procedure Laterality Date    HERNIA REPAIR        History reviewed. No pertinent family history.   Social History     Tobacco Use    Smoking status: Some Days     Types: Cigarettes    Smokeless tobacco: Never   Vaping Use    Vaping status: Every Day    Substances: Nicotine, Flavoring   Substance Use Topics    Alcohol use: Yes     Comment: socially    Drug use: No      E-Cigarette/Vaping    E-Cigarette Use Current Every Day User       E-Cigarette/Vaping Substances    Nicotine Yes     THC No     CBD No     Flavoring Yes     Other No     Unknown No       I have reviewed and agree with the history as documented.     25-year-old male present emergency department due to a laceration to the inner  "aspect of his right forearm.  Notes that he was falling behind a friend who dropped a box, he attempted to catch it but it cut the forearm.  Denies other injuries in the event.  Unsure when his last tetanus is.  No other complaints at this time.        Review of Systems   All other systems reviewed and are negative.          Objective       ED Triage Vitals [01/31/25 1848]   Temperature Pulse Blood Pressure Respirations SpO2 Patient Position - Orthostatic VS   98.9 °F (37.2 °C) 103 124/80 20 98 % Sitting      Temp Source Heart Rate Source BP Location FiO2 (%) Pain Score    Tympanic Monitor Left arm -- 3      Vitals      Date and Time Temp Pulse SpO2 Resp BP Pain Score FACES Pain Rating User   01/31/25 1848 98.9 °F (37.2 °C) 103 98 % 20 124/80 3 -- SW            Physical Exam  Constitutional:       General: He is not in acute distress.     Appearance: Normal appearance. He is not ill-appearing or toxic-appearing.   HENT:      Head: Normocephalic and atraumatic.      Mouth/Throat:      Mouth: Mucous membranes are moist.   Eyes:      Extraocular Movements: Extraocular movements intact.   Pulmonary:      Effort: Pulmonary effort is normal.   Abdominal:      Palpations: Abdomen is soft.   Skin:     General: Skin is warm.      Comments: 0.5 cm laceration to right forearm with superficial abrasion extending on either side. Minimal gaping.    Neurological:      Mental Status: He is alert.   Psychiatric:         Mood and Affect: Mood normal.         Results Reviewed       None            No orders to display       Universal Protocol:  procedure performed by consultantConsent: Verbal consent obtained.  Risks and benefits: risks, benefits and alternatives were discussed  Consent given by: patient  Time out: Immediately prior to procedure a \"time out\" was called to verify the correct patient, procedure, equipment, support staff and site/side marked as required.  Required items: required blood products, implants, devices, and " special equipment available  Patient identity confirmed: verbally with patient  Laceration repair    Date/Time: 1/31/2025 7:30 AM    Performed by: Beka Estrada MD  Authorized by: Beka Estrada MD  Body area: upper extremity  Location details: right upper arm  Laceration length: 0.5 cm  Foreign bodies: no foreign bodies  Tendon involvement: none  Nerve involvement: none  Vascular damage: no    Anesthesia:  Local Anesthetic: LET (lido, epi, tetracaine)    Sedation:  Patient sedated: no      Wound Dehiscence:  Superficial Wound Dehiscence: simple closure      Procedure Details:  Preparation: Patient was prepped and draped in the usual sterile fashion.  Irrigation solution: saline  Irrigation method: syringe  Amount of cleaning: standard  Debridement: none  Degree of undermining: none  Skin closure: 5-0 Prolene  Number of sutures: 2  Technique: simple  Approximation: close  Approximation difficulty: simple  Dressing: non-adhesive packing strip  Patient tolerance: patient tolerated the procedure well with no immediate complications          ED Medication and Procedure Management   Prior to Admission Medications   Prescriptions Last Dose Informant Patient Reported? Taking?   meclizine (ANTIVERT) 25 mg tablet   No No   Sig: Take 1 tablet (25 mg total) by mouth 3 (three) times a day as needed for dizziness      Facility-Administered Medications: None     Discharge Medication List as of 1/31/2025  7:20 PM        CONTINUE these medications which have NOT CHANGED    Details   meclizine (ANTIVERT) 25 mg tablet Take 1 tablet (25 mg total) by mouth 3 (three) times a day as needed for dizziness, Starting Sat 7/27/2024, Normal           No discharge procedures on file.  ED SEPSIS DOCUMENTATION   Time reflects when diagnosis was documented in both MDM as applicable and the Disposition within this note       Time User Action Codes Description Comment    1/31/2025  7:17 PM Beka Estrada Add [S51.811A] Laceration of right  forearm, initial encounter                  Beka Estrada MD  02/02/25 5046

## 2025-02-05 ENCOUNTER — OFFICE VISIT (OUTPATIENT)
Dept: URGENT CARE | Facility: CLINIC | Age: 25
End: 2025-02-05
Payer: COMMERCIAL

## 2025-02-05 VITALS
SYSTOLIC BLOOD PRESSURE: 119 MMHG | RESPIRATION RATE: 19 BRPM | HEART RATE: 85 BPM | HEIGHT: 70 IN | TEMPERATURE: 97.5 F | OXYGEN SATURATION: 96 % | DIASTOLIC BLOOD PRESSURE: 67 MMHG | BODY MASS INDEX: 27.97 KG/M2 | WEIGHT: 195.38 LBS

## 2025-02-05 DIAGNOSIS — H93.13 TINNITUS OF BOTH EARS: ICD-10-CM

## 2025-02-05 DIAGNOSIS — H66.92 LEFT OTITIS MEDIA, UNSPECIFIED OTITIS MEDIA TYPE: Primary | ICD-10-CM

## 2025-02-05 PROCEDURE — 99213 OFFICE O/P EST LOW 20 MIN: CPT | Performed by: PHYSICIAN ASSISTANT

## 2025-02-05 RX ORDER — FLUTICASONE PROPIONATE 50 MCG
1 SPRAY, SUSPENSION (ML) NASAL DAILY
Qty: 16 G | Refills: 0 | Status: SHIPPED | OUTPATIENT
Start: 2025-02-05

## 2025-02-05 RX ORDER — PREDNISONE 10 MG/1
30 TABLET ORAL DAILY
Qty: 9 TABLET | Refills: 0 | Status: SHIPPED | OUTPATIENT
Start: 2025-02-05 | End: 2025-02-08

## 2025-02-05 NOTE — PROGRESS NOTES
"  [facility]        NAME: Anthony Ramirez is a 25 y.o. male  : 2000    MRN: [id number]  DATE: 2025  TIME: 7:22 PM    Assessment and Plan   Left otitis media, unspecified otitis media type [H66.92]  1. Left otitis media, unspecified otitis media type  amoxicillin-clavulanate (AUGMENTIN) 875-125 mg per tablet    predniSONE 10 mg tablet    fluticasone (FLONASE) 50 mcg/act nasal spray      2. Tinnitus of both ears  Ambulatory Referral to Otolaryngology            Patient Instructions     Patient Instructions   Follow up with ENT     Patient Education     Ear infections in adults   The Basics   Written by the doctors and editors at [facility]   What is an ear infection? -- An ear infection is a condition that can cause pain in the ear, fever, and trouble hearing. Ear infections are more common in children than in adults.  Ear infections often occur after a cold or problem with seasonal allergies. Ear infections in adults happen more often in people who have a problem with their Eustachian tubes. The Eustachian tube connects the middle ear (the part of the ear behind the eardrum) to the back of the nose and throat.  Fluid can build up in the middle part of the ear behind the eardrum. This fluid can become infected and press on the eardrum, causing it to bulge (figure 1). This causes symptoms.  The medical term for middle ear infections is \"otitis media.\"  What are the symptoms of an ear infection? -- In adults, the symptoms include:   Ear pain   Temporary hearing loss   Feeling dizzy  How do I know if I have an ear infection? -- If you think that you have an ear infection, see a doctor or nurse. They will ask you about your symptoms, do an exam, and look in your ears.  How is an ear infection treated? -- Doctors treat ear infections in adults with antibiotics. These medicines kill the bacteria that cause some ear infections. Even though some ear infections are caused by a virus, doctors often " prescribe antibiotics for adults anyway. That's because ear infections can lead to other problems if they are not treated quickly.  Is there anything I can do on my own to feel better?    Take all of your medicines as instructed. If the doctor prescribes antibiotics, finish all of them, even if you start to feel better.   You can take non-prescription medicines to relieve pain. Examples include acetaminophen (sample brand name: Tylenol), ibuprofen (sample brand names: Advil, Motrin), or naproxen (sample brand name: Aleve).   You can try ice or heat to help with ear pain. Do not sleep with ice or heat on your ear.   Put a cold gel pack, bag of ice, or bag of frozen vegetables on the ear every 1 to 2 hours, for 15 minutes each time. Put a thin towel between the ice (or other cold object) and the skin.   Put a heating pad, warm towel, or hot water bottle on the ear every 1 to 2 hours, for 15 minutes at a time. Put a thin towel between the warm object and the skin.   Do not put anything in your ear unless the doctor or nurse told you to.   Airplane travel can make ear pain worse, especially as the plane starts to land. Chewing gum, drinking, or eating food might help.  Can ear infections be prevented? -- To lower your risk of getting an ear infection:   If you smoke, try to stop. Avoid places where others are smoking.   Wash your hands often.   Stay away from others who are sick with a cold or viral infection.   Get all of the vaccines your doctor recommends.  When should I call the doctor? -- Call for advice if:   Your symptoms are not getting better in 2 to 3 days.   You continue to have hearing problems after 2 to 3 weeks.   You have a fever of 100.4°F (38°C) or higher, or chills.   You have discharge or drainage coming from your ear.  All topics are updated as new evidence becomes available and our peer review process is complete.  This topic retrieved from [employer] on: May 15, 2024.  Topic 728165 Version  "1.0  Release: 32.4.3 - C32.134  © 2024 UpToDate, Inc. and/or its affiliates. All rights reserved.  figure 1: Ear infection (otitis media)     The ear on the left is normal and does not have an infection. The ear on the right shows what an infection can look like. The infected fluid in the middle ear causes the eardrum to bulge. Normally, fluid in the middle ear drains into the throat through a tube called the \"Eustachian tube.\" But during an infection, swelling blocks off the tube, so fluid builds up.  Graphic 90389 Version 8.0  Consumer Information Use and Disclaimer   Disclaimer: This generalized information is a limited summary of diagnosis, treatment, and/or medication information. It is not meant to be comprehensive and should be used as a tool to help the user understand and/or assess potential diagnostic and treatment options. It does NOT include all information about conditions, treatments, medications, side effects, or risks that may apply to a specific patient. It is not intended to be medical advice or a substitute for the medical advice, diagnosis, or treatment of a health care provider based on the health care provider's examination and assessment of a patient's specific and unique circumstances. Patients must speak with a health care provider for complete information about their health, medical questions, and treatment options, including any risks or benefits regarding use of medications. This information does not endorse any treatments or medications as safe, effective, or approved for treating a specific patient. UpToDate, Inc. and its affiliates disclaim any warranty or liability relating to this information or the use thereof.The use of this information is governed by the Terms of Use, available at https://www.woltersWallCompassuwer.com/en/know/clinical-effectiveness-terms. 2024© UpToDate, Inc. and its affiliates and/or licensors. All rights reserved.  Copyright   © 2024 UpToDate, Inc. and/or its " affiliates. All rights reserved.        Follow up with PCP in 3-5 days.  Proceed to  ER if symptoms worsen.    Chief Complaint     Chief Complaint   Patient presents with    Earache     Bilateral ear pain and ringing. He feels fluid moving when he moves his head. Ears feel clogged          History of Present Illness       The patient is a 25-year-old male presenting today for pain in both of his ears and tinnitus.  Reports feeling like fluid is moving behind the ears when he moves his head. Denies URI symptoms. Does see ENT but has not seen them recently. Last visit was last June.         Review of Systems   Review of Systems   Constitutional:  Negative for activity change, appetite change, chills, diaphoresis and fever.   HENT:  Positive for ear pain. Negative for congestion, rhinorrhea and sore throat.    Eyes:  Negative for pain and visual disturbance.   Respiratory:  Negative for cough, chest tightness and shortness of breath.    Cardiovascular:  Negative for chest pain and palpitations.   Gastrointestinal:  Negative for abdominal pain, diarrhea, nausea and vomiting.   Genitourinary:  Negative for dysuria and hematuria.   Musculoskeletal:  Negative for arthralgias, back pain and myalgias.   Skin:  Negative for color change, pallor and rash.   Neurological:  Negative for seizures, syncope and headaches.   All other systems reviewed and are negative.        Current Medications       Current Outpatient Medications:     amoxicillin-clavulanate (AUGMENTIN) 875-125 mg per tablet, Take 1 tablet by mouth every 12 (twelve) hours for 7 days, Disp: 14 tablet, Rfl: 0    fluticasone (FLONASE) 50 mcg/act nasal spray, 1 spray into each nostril daily, Disp: 16 g, Rfl: 0    predniSONE 10 mg tablet, Take 3 tablets (30 mg total) by mouth daily for 3 days, Disp: 9 tablet, Rfl: 0    meclizine (ANTIVERT) 25 mg tablet, Take 1 tablet (25 mg total) by mouth 3 (three) times a day as needed for dizziness (Patient not taking: Reported on  "2/5/2025), Disp: 15 tablet, Rfl: 0    Current Allergies     Allergies as of 02/05/2025 - Reviewed 02/05/2025   Allergen Reaction Noted    Other  02/10/2018    Spectazole [econazole] Rash 11/07/2022            The following portions of the patient's history were reviewed and updated as appropriate: allergies, current medications, past family history, past medical history, past social history, past surgical history and problem list.     History reviewed. No pertinent past medical history.    Past Surgical History:   Procedure Laterality Date    HERNIA REPAIR         History reviewed. No pertinent family history.      Medications have been verified.        Objective   /67 (BP Location: Left arm, Patient Position: Sitting, Cuff Size: Large)   Pulse 85   Temp 97.5 °F (36.4 °C) (Temporal)   Resp 19   Ht 5' 10\" (1.778 m)   Wt 88.6 kg (195 lb 6 oz)   SpO2 96%   BMI 28.03 kg/m²        Physical Exam     Physical Exam  Constitutional:       General: He is not in acute distress.     Appearance: Normal appearance. He is normal weight. He is not ill-appearing, toxic-appearing or diaphoretic.   HENT:      Head: Normocephalic and atraumatic.      Right Ear: Tympanic membrane, ear canal and external ear normal. There is no impacted cerumen.      Left Ear: Ear canal and external ear normal. There is no impacted cerumen.      Ears:      Comments: Slight injection of L TM     Nose: Nose normal. No congestion or rhinorrhea.      Mouth/Throat:      Mouth: Mucous membranes are moist.      Pharynx: Oropharynx is clear. No oropharyngeal exudate or posterior oropharyngeal erythema.   Eyes:      Extraocular Movements: Extraocular movements intact.      Conjunctiva/sclera: Conjunctivae normal.      Pupils: Pupils are equal, round, and reactive to light.   Cardiovascular:      Rate and Rhythm: Normal rate and regular rhythm.      Heart sounds: Normal heart sounds. No murmur heard.     No friction rub. No gallop.   Pulmonary:      " Effort: Pulmonary effort is normal. No respiratory distress.      Breath sounds: Normal breath sounds. No stridor. No wheezing, rhonchi or rales.   Chest:      Chest wall: No tenderness.   Abdominal:      General: Abdomen is flat. Bowel sounds are normal. There is no distension.      Palpations: Abdomen is soft. There is no mass.      Tenderness: There is no abdominal tenderness. There is no guarding or rebound.      Hernia: No hernia is present.   Musculoskeletal:         General: Normal range of motion.      Cervical back: Normal range of motion.   Lymphadenopathy:      Cervical: No cervical adenopathy.   Skin:     General: Skin is warm and dry.      Capillary Refill: Capillary refill takes less than 2 seconds.   Neurological:      Mental Status: He is alert.

## 2025-02-05 NOTE — LETTER
February 5, 2025     Patient: Anthony Ramirez  YOB: 2000  Date of Visit: 2/5/2025      To Whom it May Concern:    Anthony Ramirez is under my professional care. Anthony was seen in my office on 2/5/2025. Anthony may return to work on 2/7/25 .    If you have any questions or concerns, please don't hesitate to call.         Sincerely,          Annabelle Augustin PA-C        CC: No Recipients

## 2025-02-06 NOTE — PATIENT INSTRUCTIONS
"Follow up with ENT     Patient Education     Ear infections in adults   The Basics   Written by the doctors and editors at [facility]   What is an ear infection? -- An ear infection is a condition that can cause pain in the ear, fever, and trouble hearing. Ear infections are more common in children than in adults.  Ear infections often occur after a cold or problem with seasonal allergies. Ear infections in adults happen more often in people who have a problem with their Eustachian tubes. The Eustachian tube connects the middle ear (the part of the ear behind the eardrum) to the back of the nose and throat.  Fluid can build up in the middle part of the ear behind the eardrum. This fluid can become infected and press on the eardrum, causing it to bulge (figure 1). This causes symptoms.  The medical term for middle ear infections is \"otitis media.\"  What are the symptoms of an ear infection? -- In adults, the symptoms include:   Ear pain   Temporary hearing loss   Feeling dizzy  How do I know if I have an ear infection? -- If you think that you have an ear infection, see a doctor or nurse. They will ask you about your symptoms, do an exam, and look in your ears.  How is an ear infection treated? -- Doctors treat ear infections in adults with antibiotics. These medicines kill the bacteria that cause some ear infections. Even though some ear infections are caused by a virus, doctors often prescribe antibiotics for adults anyway. That's because ear infections can lead to other problems if they are not treated quickly.  Is there anything I can do on my own to feel better?    Take all of your medicines as instructed. If the doctor prescribes antibiotics, finish all of them, even if you start to feel better.   You can take non-prescription medicines to relieve pain. Examples include acetaminophen (sample brand name: Tylenol), ibuprofen (sample brand names: Advil, Motrin), or naproxen (sample brand name: Aleve).   You can try " "ice or heat to help with ear pain. Do not sleep with ice or heat on your ear.   Put a cold gel pack, bag of ice, or bag of frozen vegetables on the ear every 1 to 2 hours, for 15 minutes each time. Put a thin towel between the ice (or other cold object) and the skin.   Put a heating pad, warm towel, or hot water bottle on the ear every 1 to 2 hours, for 15 minutes at a time. Put a thin towel between the warm object and the skin.   Do not put anything in your ear unless the doctor or nurse told you to.   Airplane travel can make ear pain worse, especially as the plane starts to land. Chewing gum, drinking, or eating food might help.  Can ear infections be prevented? -- To lower your risk of getting an ear infection:   If you smoke, try to stop. Avoid places where others are smoking.   Wash your hands often.   Stay away from others who are sick with a cold or viral infection.   Get all of the vaccines your doctor recommends.  When should I call the doctor? -- Call for advice if:   Your symptoms are not getting better in 2 to 3 days.   You continue to have hearing problems after 2 to 3 weeks.   You have a fever of 100.4°F (38°C) or higher, or chills.   You have discharge or drainage coming from your ear.  All topics are updated as new evidence becomes available and our peer review process is complete.  This topic retrieved from [employer] on: May 15, 2024.  Topic 488354 Version 1.0  Release: 32.4.3 - C32.134  © 2024 UpToDate, Inc. and/or its affiliates. All rights reserved.  figure 1: Ear infection (otitis media)     The ear on the left is normal and does not have an infection. The ear on the right shows what an infection can look like. The infected fluid in the middle ear causes the eardrum to bulge. Normally, fluid in the middle ear drains into the throat through a tube called the \"Eustachian tube.\" But during an infection, swelling blocks off the tube, so fluid builds up.  Graphic 57736 Version 8.0  Consumer " Information Use and Disclaimer   Disclaimer: This generalized information is a limited summary of diagnosis, treatment, and/or medication information. It is not meant to be comprehensive and should be used as a tool to help the user understand and/or assess potential diagnostic and treatment options. It does NOT include all information about conditions, treatments, medications, side effects, or risks that may apply to a specific patient. It is not intended to be medical advice or a substitute for the medical advice, diagnosis, or treatment of a health care provider based on the health care provider's examination and assessment of a patient's specific and unique circumstances. Patients must speak with a health care provider for complete information about their health, medical questions, and treatment options, including any risks or benefits regarding use of medications. This information does not endorse any treatments or medications as safe, effective, or approved for treating a specific patient. UpToDate, Inc. and its affiliates disclaim any warranty or liability relating to this information or the use thereof.The use of this information is governed by the Terms of Use, available at https://www.[employer].com/en/know/clinical-effectiveness-terms. 2024© UpToDate, Inc. and its affiliates and/or licensors. All rights reserved.  Copyright   © 2024 UpToDate, Inc. and/or its affiliates. All rights reserved.

## 2025-03-07 ENCOUNTER — HOSPITAL ENCOUNTER (EMERGENCY)
Facility: HOSPITAL | Age: 25
Discharge: HOME | End: 2025-03-07
Payer: COMMERCIAL

## 2025-03-07 VITALS
RESPIRATION RATE: 32 BRPM | OXYGEN SATURATION: 99 % | SYSTOLIC BLOOD PRESSURE: 132 MMHG | DIASTOLIC BLOOD PRESSURE: 59 MMHG | HEART RATE: 144 BPM | TEMPERATURE: 100.2 F

## 2025-03-07 DIAGNOSIS — R19.7 NAUSEA VOMITING AND DIARRHEA: Primary | ICD-10-CM

## 2025-03-07 DIAGNOSIS — R11.2 NAUSEA VOMITING AND DIARRHEA: Primary | ICD-10-CM

## 2025-03-07 DIAGNOSIS — R68.89 FLU-LIKE SYMPTOMS: ICD-10-CM

## 2025-03-07 LAB
ALBUMIN SERPL BCG-MCNC: 5.1 G/DL (ref 3.5–5)
ALP SERPL-CCNC: 49 U/L (ref 34–104)
ALT SERPL W P-5'-P-CCNC: 23 U/L (ref 7–52)
ANION GAP SERPL CALCULATED.3IONS-SCNC: 15 MMOL/L (ref 4–13)
AST SERPL W P-5'-P-CCNC: 20 U/L (ref 13–39)
BASOPHILS # BLD AUTO: 0.02 THOUSANDS/ÂΜL (ref 0–0.1)
BASOPHILS NFR BLD AUTO: 0 % (ref 0–1)
BILIRUB SERPL-MCNC: 1.43 MG/DL (ref 0.2–1)
BUN SERPL-MCNC: 15 MG/DL (ref 5–25)
CALCIUM SERPL-MCNC: 10 MG/DL (ref 8.4–10.2)
CHLORIDE SERPL-SCNC: 101 MMOL/L (ref 96–108)
CO2 SERPL-SCNC: 21 MMOL/L (ref 21–32)
CREAT SERPL-MCNC: 0.97 MG/DL (ref 0.6–1.3)
EOSINOPHIL # BLD AUTO: 0.04 THOUSAND/ÂΜL (ref 0–0.61)
EOSINOPHIL NFR BLD AUTO: 0 % (ref 0–6)
ERYTHROCYTE [DISTWIDTH] IN BLOOD BY AUTOMATED COUNT: 11.6 % (ref 11.6–15.1)
FLUAV AG UPPER RESP QL IA.RAPID: NEGATIVE
FLUBV AG UPPER RESP QL IA.RAPID: NEGATIVE
GFR SERPL CREATININE-BSD FRML MDRD: 108 ML/MIN/1.73SQ M
GLUCOSE SERPL-MCNC: 89 MG/DL (ref 65–140)
HCT VFR BLD AUTO: 48.9 % (ref 36.5–49.3)
HGB BLD-MCNC: 17.3 G/DL (ref 12–17)
IMM GRANULOCYTES # BLD AUTO: 0.02 THOUSAND/UL (ref 0–0.2)
IMM GRANULOCYTES NFR BLD AUTO: 0 % (ref 0–2)
LIPASE SERPL-CCNC: 8 U/L (ref 11–82)
LYMPHOCYTES # BLD AUTO: 0.24 THOUSANDS/ÂΜL (ref 0.6–4.47)
LYMPHOCYTES NFR BLD AUTO: 2 % (ref 14–44)
MCH RBC QN AUTO: 30.6 PG (ref 26.8–34.3)
MCHC RBC AUTO-ENTMCNC: 35.4 G/DL (ref 31.4–37.4)
MCV RBC AUTO: 87 FL (ref 82–98)
MONOCYTES # BLD AUTO: 0.61 THOUSAND/ÂΜL (ref 0.17–1.22)
MONOCYTES NFR BLD AUTO: 6 % (ref 4–12)
NEUTROPHILS # BLD AUTO: 9.58 THOUSANDS/ÂΜL (ref 1.85–7.62)
NEUTS SEG NFR BLD AUTO: 92 % (ref 43–75)
NRBC BLD AUTO-RTO: 0 /100 WBCS
PLATELET # BLD AUTO: 206 THOUSANDS/UL (ref 149–390)
PLATELET BLD QL SMEAR: ADEQUATE
PMV BLD AUTO: 9.6 FL (ref 8.9–12.7)
POTASSIUM SERPL-SCNC: 3.7 MMOL/L (ref 3.5–5.3)
PROT SERPL-MCNC: 7.8 G/DL (ref 6.4–8.4)
RBC # BLD AUTO: 5.65 MILLION/UL (ref 3.88–5.62)
RBC MORPH BLD: NORMAL
SARS-COV+SARS-COV-2 AG RESP QL IA.RAPID: NEGATIVE
SODIUM SERPL-SCNC: 137 MMOL/L (ref 135–147)
WBC # BLD AUTO: 10.51 THOUSAND/UL (ref 4.31–10.16)

## 2025-03-07 PROCEDURE — 80053 COMPREHEN METABOLIC PANEL: CPT

## 2025-03-07 PROCEDURE — 99284 EMERGENCY DEPT VISIT MOD MDM: CPT

## 2025-03-07 PROCEDURE — 99283 EMERGENCY DEPT VISIT LOW MDM: CPT

## 2025-03-07 PROCEDURE — 87804 INFLUENZA ASSAY W/OPTIC: CPT

## 2025-03-07 PROCEDURE — 36415 COLL VENOUS BLD VENIPUNCTURE: CPT

## 2025-03-07 PROCEDURE — 83690 ASSAY OF LIPASE: CPT

## 2025-03-07 PROCEDURE — 87811 SARS-COV-2 COVID19 W/OPTIC: CPT

## 2025-03-07 PROCEDURE — 85025 COMPLETE CBC W/AUTO DIFF WBC: CPT

## 2025-03-07 PROCEDURE — 96360 HYDRATION IV INFUSION INIT: CPT

## 2025-03-07 RX ORDER — ONDANSETRON 4 MG/1
4 TABLET, ORALLY DISINTEGRATING ORAL EVERY 6 HOURS PRN
Qty: 15 TABLET | Refills: 0 | Status: SHIPPED | OUTPATIENT
Start: 2025-03-07

## 2025-03-07 RX ORDER — ONDANSETRON 2 MG/ML
4 INJECTION INTRAMUSCULAR; INTRAVENOUS ONCE
Status: DISCONTINUED | OUTPATIENT
Start: 2025-03-07 | End: 2025-03-08 | Stop reason: HOSPADM

## 2025-03-07 RX ORDER — LOPERAMIDE HYDROCHLORIDE 2 MG/1
2 CAPSULE ORAL 4 TIMES DAILY PRN
Qty: 12 CAPSULE | Refills: 0 | Status: SHIPPED | OUTPATIENT
Start: 2025-03-07

## 2025-03-07 RX ADMIN — SODIUM CHLORIDE 1000 ML: 0.9 INJECTION, SOLUTION INTRAVENOUS at 20:32

## 2025-03-07 NOTE — Clinical Note
Anthony Ramirez was seen and treated in our emergency department on 3/7/2025.                Diagnosis:     Anthony  may return to work on return date.    He may return on this date: 03/10/2025         If you have any questions or concerns, please don't hesitate to call.      Rickie Augustine MD    ______________________________           _______________          _______________  Hospital Representative                              Date                                Time

## 2025-03-08 NOTE — ED PROVIDER NOTES
"Time reflects when diagnosis was documented in both MDM as applicable and the Disposition within this note       Time User Action Codes Description Comment    3/7/2025  9:48 PM Rickie Augustine [R11.2,  R19.7] Nausea vomiting and diarrhea     3/7/2025  9:48 PM Rickie Augustine [R68.89] Flu-like symptoms           ED Disposition       ED Disposition   Discharge    Condition   Stable    Date/Time   Fri Mar 7, 2025  9:48 PM    Comment   Anthony Ramirez discharge to home/self care.                   Assessment & Plan       Medical Decision Making  Amount and/or Complexity of Data Reviewed  Labs: ordered. Decision-making details documented in ED Course.    Risk  Prescription drug management.      24 y/o M presents for evaluation of acute n/v/d. Pt reports feeling usual state of health this AM. This afternoon had [employer] and [employer]. Thinks maybe star[employer] drink was \"off.\" Started feeling flushed, with watery diarrhea. Developed abd distension which was relieved by vomiting. Generally feels unwell.   Fever, tachy on arrival  Pt overall well appearing with benign abd  Suspect influenza vs viral gastroenteritis vs food borne GI illness  Plan: labs, antiemetic, IVF, covid/flu testing  Labs confirm mild dehydration, no actionable derangements  Viral testing negative  Pt tolerating PO in ED. Regardless of etiologies, management same, oral hydration, prn antiemetic and antidiarrheal. Pt stable for discharge at this time.       ED Course as of 03/07/25 2347   Fri Mar 07, 2025   2057 Hemoglobin(!): 17.3  Hemoconcentrated likely 2/2 mild dehydration        Medications   ondansetron (ZOFRAN) injection 4 mg (4 mg Intravenous Not Given 3/7/25 2032)   sodium chloride 0.9 % bolus 1,000 mL (0 mL Intravenous Stopped 3/7/25 2132)       ED Risk Strat Scores                                                History of Present Illness       Chief Complaint   Patient presents with    Abdominal Pain     Started with vomiting and " diarrhea about 3pm, now having pain       History reviewed. No pertinent past medical history.   Past Surgical History:   Procedure Laterality Date    HERNIA REPAIR        History reviewed. No pertinent family history.   Social History     Tobacco Use    Smoking status: Former     Types: Cigarettes    Smokeless tobacco: Never    Tobacco comments:     vape   Vaping Use    Vaping status: Every Day    Substances: Nicotine, Flavoring   Substance Use Topics    Alcohol use: Yes     Comment: socially    Drug use: No      E-Cigarette/Vaping    E-Cigarette Use Current Every Day User       E-Cigarette/Vaping Substances    Nicotine Yes     THC No     CBD No     Flavoring Yes     Other No     Unknown No       I have reviewed and agree with the history as documented.     HPI  See mdm  Review of Systems   Constitutional:  Negative for chills and fever.   HENT:  Negative for ear pain and sore throat.    Eyes:  Negative for pain and visual disturbance.   Respiratory:  Negative for cough and shortness of breath.    Cardiovascular:  Negative for chest pain and palpitations.   Gastrointestinal:  Positive for abdominal pain, diarrhea, nausea and vomiting.   Genitourinary:  Negative for dysuria and hematuria.   Musculoskeletal:  Negative for arthralgias and back pain.   Skin:  Negative for color change and rash.   Neurological:  Negative for seizures and syncope.   All other systems reviewed and are negative.          Objective       ED Triage Vitals [03/07/25 1952]   Temperature Pulse Blood Pressure Respirations SpO2 Patient Position - Orthostatic VS   100.2 °F (37.9 °C) (!) 144 132/59 (!) 32 99 % Sitting      Temp Source Heart Rate Source BP Location FiO2 (%) Pain Score    Tympanic Monitor Right arm -- 10 - Worst Possible Pain      Vitals      Date and Time Temp Pulse SpO2 Resp BP Pain Score FACES Pain Rating User   03/07/25 2131 -- -- -- -- -- 5 -- CM   03/07/25 1952 100.2 °F (37.9 °C) 144 99 % 32 132/59 10 - Worst Possible Pain --              Physical Exam  Vitals and nursing note reviewed.   Constitutional:       General: He is not in acute distress.     Appearance: He is well-developed. He is not toxic-appearing.   HENT:      Head: Normocephalic and atraumatic.      Right Ear: External ear normal.      Left Ear: External ear normal.      Nose: Nose normal.      Mouth/Throat:      Pharynx: Oropharynx is clear. No oropharyngeal exudate or posterior oropharyngeal erythema.   Eyes:      Extraocular Movements: Extraocular movements intact.      Conjunctiva/sclera: Conjunctivae normal.      Pupils: Pupils are equal, round, and reactive to light.   Cardiovascular:      Rate and Rhythm: Regular rhythm. Tachycardia present.      Pulses: Normal pulses.      Heart sounds: Normal heart sounds. No murmur heard.     No friction rub. No gallop.   Pulmonary:      Effort: Pulmonary effort is normal. No respiratory distress.      Breath sounds: Normal breath sounds. No wheezing, rhonchi or rales.   Abdominal:      General: Abdomen is flat.      Palpations: Abdomen is soft.      Tenderness: There is generalized abdominal tenderness. There is no guarding or rebound.   Musculoskeletal:         General: Normal range of motion.      Cervical back: Normal range of motion. No rigidity.      Right lower leg: No edema.      Left lower leg: No edema.   Skin:     General: Skin is warm and dry.      Capillary Refill: Capillary refill takes less than 2 seconds.   Neurological:      General: No focal deficit present.      Mental Status: He is alert.   Psychiatric:         Mood and Affect: Mood normal.         Results Reviewed       Procedure Component Value Units Date/Time    CBC and differential [[id number]]  (Abnormal) Collected: 03/07/25 2030    Lab Status: Final result Specimen: Blood from Arm, Right Updated: 03/07/25 2139     WBC 10.51 Thousand/uL      RBC 5.65 Million/uL      Hemoglobin 17.3 g/dL      Hematocrit 48.9 %      MCV 87 fL      MCH 30.6 pg      MCHC 35.4  g/dL      RDW 11.6 %      MPV 9.6 fL      Platelets 206 Thousands/uL      nRBC 0 /100 WBCs      Segmented % 92 %      Immature Grans % 0 %      Lymphocytes % 2 %      Monocytes % 6 %      Eosinophils Relative 0 %      Basophils Relative 0 %      Absolute Neutrophils 9.58 Thousands/µL      Absolute Immature Grans 0.02 Thousand/uL      Absolute Lymphocytes 0.24 Thousands/µL      Absolute Monocytes 0.61 Thousand/µL      Eosinophils Absolute 0.04 Thousand/µL      Basophils Absolute 0.02 Thousands/µL     Narrative:      This is an appended report.  These results have been appended to a previously verified report.    Smear Review(Phlebs Do Not Order) [[id number]] Collected: 03/07/25 2030    Lab Status: Final result Specimen: Blood from Arm, Right Updated: 03/07/25 2139     RBC Morphology Normal     Platelet Estimate Adequate    Comprehensive metabolic panel [[id number]]  (Abnormal) Collected: 03/07/25 2030    Lab Status: Final result Specimen: Blood from Arm, Right Updated: 03/07/25 2116     Sodium 137 mmol/L      Potassium 3.7 mmol/L      Chloride 101 mmol/L      CO2 21 mmol/L      ANION GAP 15 mmol/L      BUN 15 mg/dL      Creatinine 0.97 mg/dL      Glucose 89 mg/dL      Calcium 10.0 mg/dL      AST 20 U/L      ALT 23 U/L      Alkaline Phosphatase 49 U/L      Total Protein 7.8 g/dL      Albumin 5.1 g/dL      Total Bilirubin 1.43 mg/dL      eGFR 108 ml/min/1.73sq m     Narrative:      National Kidney Disease Foundation guidelines for Chronic Kidney Disease (CKD):     Stage 1 with normal or high GFR (GFR > 90 mL/min/1.73 square meters)    Stage 2 Mild CKD (GFR = 60-89 mL/min/1.73 square meters)    Stage 3A Moderate CKD (GFR = 45-59 mL/min/1.73 square meters)    Stage 3B Moderate CKD (GFR = 30-44 mL/min/1.73 square meters)    Stage 4 Severe CKD (GFR = 15-29 mL/min/1.73 square meters)    Stage 5 End Stage CKD (GFR <15 mL/min/1.73 square meters)  Note: GFR calculation is accurate only with a steady state creatinine    Lipase  [[id number]]  (Abnormal) Collected: 03/07/25 2030    Lab Status: Final result Specimen: Blood from Arm, Right Updated: 03/07/25 2116     Lipase 8 u/L     FLU/COVID Rapid Antigen (30 min. TAT) - Preferred screening test in ED [[id number]]  (Normal) Collected: 03/07/25 2030    Lab Status: Final result Specimen: Nares from Nose Updated: 03/07/25 2116     SARS COV Rapid Antigen Negative     Influenza A Rapid Antigen Negative     Influenza B Rapid Antigen Negative    Narrative:      This test has been performed using the Seen Shwetha 2 FLU+SARS Antigen test under the Emergency Use Authorization (EUA). This test has been validated by the  and verified by the performing laboratory. The Shwetha uses lateral flow immunofluorescent sandwich assay to detect SARS-COV, Influenza A and Influenza B Antigen.     The Quidel Shwetha 2 SARS Antigen test does not differentiate between SARS-CoV and SARS-CoV-2.     Negative results are presumptive and may be confirmed with a molecular assay, if necessary, for patient management. Negative results do not rule out SARS-CoV-2 or influenza infection and should not be used as the sole basis for treatment or patient management decisions. A negative test result may occur if the level of antigen in a sample is below the limit of detection of this test.     Positive results are indicative of the presence of viral antigens, but do not rule out bacterial infection or co-infection with other viruses.     All test results should be used as an adjunct to clinical observations and other information available to the provider.    FOR PEDIATRIC PATIENTS - copy/paste COVID Guidelines URL to browser: [email or web address]            No orders to display       Procedures    ED Medication and Procedure Management   Prior to Admission Medications   Prescriptions Last Dose Informant Patient Reported? Taking?   fluticasone (FLONASE) 50 mcg/act nasal spray    No No   Si spray into each nostril daily   meclizine (ANTIVERT) 25 mg tablet   No No   Sig: Take 1 tablet (25 mg total) by mouth 3 (three) times a day as needed for dizziness   Patient not taking: Reported on 2025      Facility-Administered Medications: None     Discharge Medication List as of 3/7/2025  9:49 PM        START taking these medications    Details   loperamide (IMODIUM) 2 mg capsule Take 1 capsule (2 mg total) by mouth 4 (four) times a day as needed for diarrhea, Starting Fri 3/7/2025, Normal      ondansetron (ZOFRAN-ODT) 4 mg disintegrating tablet Take 1 tablet (4 mg total) by mouth every 6 (six) hours as needed for nausea, Starting Fri 3/7/2025, Normal           CONTINUE these medications which have NOT CHANGED    Details   fluticasone (FLONASE) 50 mcg/act nasal spray 1 spray into each nostril daily, Starting Wed 2025, Normal      meclizine (ANTIVERT) 25 mg tablet Take 1 tablet (25 mg total) by mouth 3 (three) times a day as needed for dizziness, Starting Sat 2024, Normal           No discharge procedures on file.  ED SEPSIS DOCUMENTATION   Time reflects when diagnosis was documented in both MDM as applicable and the Disposition within this note       Time User Action Codes Description Comment    3/7/2025  9:48 PM Rickie Augustine [R11.2,  R19.7] Nausea vomiting and diarrhea     3/7/2025  9:48 PM Rickie Augustine [R68.89] Flu-like symptoms                  Rickie Augustine MD  25 2009